# Patient Record
Sex: MALE | Race: WHITE | Employment: OTHER | URBAN - METROPOLITAN AREA
[De-identification: names, ages, dates, MRNs, and addresses within clinical notes are randomized per-mention and may not be internally consistent; named-entity substitution may affect disease eponyms.]

---

## 2017-02-07 PROBLEM — I21.02 ST ELEVATION MYOCARDIAL INFARCTION INVOLVING LEFT ANTERIOR DESCENDING (LAD) CORONARY ARTERY (HCC): Status: ACTIVE | Noted: 2017-02-07

## 2017-03-10 PROBLEM — I25.10 CORONARY ARTERY DISEASE INVOLVING NATIVE CORONARY ARTERY OF NATIVE HEART WITHOUT ANGINA PECTORIS: Status: ACTIVE | Noted: 2017-03-10

## 2017-03-10 PROBLEM — I21.02 ST ELEVATION MYOCARDIAL INFARCTION INVOLVING LEFT ANTERIOR DESCENDING (LAD) CORONARY ARTERY (HCC): Status: RESOLVED | Noted: 2017-02-07 | Resolved: 2017-03-10

## 2017-06-09 PROBLEM — E11.9 CONTROLLED TYPE 2 DIABETES MELLITUS (HCC): Status: ACTIVE | Noted: 2017-06-09

## 2017-06-09 PROBLEM — I25.5 ISCHEMIC CARDIOMYOPATHY: Status: ACTIVE | Noted: 2017-06-09

## 2019-01-25 PROBLEM — Z88.8 ALLERGY TO STATIN MEDICATION: Status: ACTIVE | Noted: 2019-01-25

## 2019-01-25 PROBLEM — E66.01 SEVERE OBESITY (HCC): Status: ACTIVE | Noted: 2019-01-25

## 2019-01-25 PROBLEM — I25.118 CORONARY ARTERY DISEASE OF NATIVE ARTERY OF NATIVE HEART WITH STABLE ANGINA PECTORIS (HCC): Status: ACTIVE | Noted: 2017-03-10

## 2019-01-25 PROBLEM — E78.01 FAMILIAL HYPERCHOLESTEROLEMIA: Status: ACTIVE | Noted: 2019-01-25

## 2020-05-27 ENCOUNTER — HOSPITAL ENCOUNTER (OUTPATIENT)
Dept: GENERAL RADIOLOGY | Age: 65
Discharge: HOME | End: 2020-05-27
Attending: NURSE PRACTITIONER
Payer: COMMERCIAL

## 2020-05-27 DIAGNOSIS — M54.9 ACUTE BACK PAIN LESS THAN 4 WEEKS DURATION: ICD-10-CM

## 2020-05-27 PROCEDURE — 72100 X-RAY EXAM L-S SPINE 2/3 VWS: CPT

## 2020-05-27 PROCEDURE — 72070 X-RAY EXAM THORAC SPINE 2VWS: CPT

## 2020-05-27 NOTE — PROGRESS NOTES
Please let Tamiko Watson know his xrays appear to be unchanged from prior imaging studies. He could benefit from PT and I can refer him their if he would like. Continue medications and follow up as planned.

## 2020-05-27 NOTE — PROGRESS NOTES
I called pt and left detailed msg stating xray results and to return call and let us know if he wants PT referral

## 2020-09-11 ENCOUNTER — HOSPITAL ENCOUNTER (OUTPATIENT)
Dept: MRI IMAGING | Age: 65
Discharge: HOME | End: 2020-09-11
Attending: INTERNAL MEDICINE
Payer: MEDICARE

## 2020-09-11 DIAGNOSIS — M54.50 CHRONIC BILATERAL LOW BACK PAIN WITHOUT SCIATICA: ICD-10-CM

## 2020-09-11 DIAGNOSIS — G89.29 CHRONIC BILATERAL LOW BACK PAIN WITHOUT SCIATICA: ICD-10-CM

## 2020-09-11 PROCEDURE — 72148 MRI LUMBAR SPINE W/O DYE: CPT

## 2022-03-04 PROBLEM — Z12.5 SPECIAL SCREENING FOR MALIGNANT NEOPLASM OF PROSTATE: Status: ACTIVE | Noted: 2022-03-04

## 2022-03-18 PROBLEM — E66.01 SEVERE OBESITY (HCC): Status: ACTIVE | Noted: 2019-01-25

## 2022-03-18 PROBLEM — E78.01 FAMILIAL HYPERCHOLESTEROLEMIA: Status: ACTIVE | Noted: 2019-01-25

## 2022-03-18 PROBLEM — I25.5 ISCHEMIC CARDIOMYOPATHY: Status: ACTIVE | Noted: 2017-06-09

## 2022-03-19 PROBLEM — Z88.8 ALLERGY TO STATIN MEDICATION: Status: ACTIVE | Noted: 2019-01-25

## 2022-03-19 PROBLEM — I25.118 CORONARY ARTERY DISEASE OF NATIVE ARTERY OF NATIVE HEART WITH STABLE ANGINA PECTORIS (HCC): Status: ACTIVE | Noted: 2017-03-10

## 2022-03-19 PROBLEM — Z12.5 SPECIAL SCREENING FOR MALIGNANT NEOPLASM OF PROSTATE: Status: ACTIVE | Noted: 2022-03-04

## 2022-03-20 PROBLEM — E11.9 DIABETES MELLITUS TYPE II, CONTROLLED (HCC): Status: ACTIVE | Noted: 2017-06-09

## 2022-04-03 PROBLEM — Z12.5 SPECIAL SCREENING FOR MALIGNANT NEOPLASM OF PROSTATE: Status: RESOLVED | Noted: 2022-03-04 | Resolved: 2022-04-03

## 2022-08-24 DIAGNOSIS — E11.59 CONTROLLED TYPE 2 DIABETES MELLITUS WITH OTHER CIRCULATORY COMPLICATION, WITHOUT LONG-TERM CURRENT USE OF INSULIN (HCC): Primary | ICD-10-CM

## 2022-08-24 DIAGNOSIS — Z12.5 SPECIAL SCREENING FOR MALIGNANT NEOPLASM OF PROSTATE: ICD-10-CM

## 2022-08-24 DIAGNOSIS — I25.118 CORONARY ARTERY DISEASE OF NATIVE ARTERY OF NATIVE HEART WITH STABLE ANGINA PECTORIS (HCC): ICD-10-CM

## 2022-08-24 DIAGNOSIS — I25.5 ISCHEMIC CARDIOMYOPATHY: ICD-10-CM

## 2022-08-30 DIAGNOSIS — F51.01 PRIMARY INSOMNIA: Primary | ICD-10-CM

## 2022-08-30 RX ORDER — ZOLPIDEM TARTRATE 10 MG/1
10 TABLET ORAL NIGHTLY PRN
Qty: 90 TABLET | Refills: 1 | Status: SHIPPED | OUTPATIENT
Start: 2022-08-30 | End: 2022-11-28

## 2022-09-02 DIAGNOSIS — E11.59 CONTROLLED TYPE 2 DIABETES MELLITUS WITH OTHER CIRCULATORY COMPLICATION, WITHOUT LONG-TERM CURRENT USE OF INSULIN (HCC): ICD-10-CM

## 2022-09-02 DIAGNOSIS — I25.118 CORONARY ARTERY DISEASE OF NATIVE ARTERY OF NATIVE HEART WITH STABLE ANGINA PECTORIS (HCC): ICD-10-CM

## 2022-09-02 DIAGNOSIS — Z12.5 SPECIAL SCREENING FOR MALIGNANT NEOPLASM OF PROSTATE: ICD-10-CM

## 2022-09-02 DIAGNOSIS — I25.5 ISCHEMIC CARDIOMYOPATHY: ICD-10-CM

## 2022-09-02 LAB
ALBUMIN SERPL-MCNC: 3.7 G/DL (ref 3.2–4.6)
ALBUMIN/GLOB SERPL: 0.9 {RATIO} (ref 1.2–3.5)
ALP SERPL-CCNC: 60 U/L (ref 50–136)
ALT SERPL-CCNC: 19 U/L (ref 12–65)
ANION GAP SERPL CALC-SCNC: 4 MMOL/L (ref 4–13)
AST SERPL-CCNC: 8 U/L (ref 15–37)
BASOPHILS # BLD: 0 K/UL (ref 0–0.2)
BASOPHILS NFR BLD: 1 % (ref 0–2)
BILIRUB SERPL-MCNC: 0.8 MG/DL (ref 0.2–1.1)
BUN SERPL-MCNC: 21 MG/DL (ref 8–23)
CALCIUM SERPL-MCNC: 9 MG/DL (ref 8.3–10.4)
CHLORIDE SERPL-SCNC: 108 MMOL/L (ref 101–110)
CHOLEST SERPL-MCNC: 200 MG/DL
CO2 SERPL-SCNC: 27 MMOL/L (ref 21–32)
CREAT SERPL-MCNC: 1.1 MG/DL (ref 0.8–1.5)
CREAT UR-MCNC: 260 MG/DL
DIFFERENTIAL METHOD BLD: NORMAL
EOSINOPHIL # BLD: 0.3 K/UL (ref 0–0.8)
EOSINOPHIL NFR BLD: 5 % (ref 0.5–7.8)
ERYTHROCYTE [DISTWIDTH] IN BLOOD BY AUTOMATED COUNT: 13.9 % (ref 11.9–14.6)
GLOBULIN SER CALC-MCNC: 4 G/DL (ref 2.3–3.5)
GLUCOSE SERPL-MCNC: 96 MG/DL (ref 65–100)
HCT VFR BLD AUTO: 46 % (ref 41.1–50.3)
HDLC SERPL-MCNC: 33 MG/DL (ref 40–60)
HDLC SERPL: 6.1 {RATIO}
HGB BLD-MCNC: 14.6 G/DL (ref 13.6–17.2)
IMM GRANULOCYTES # BLD AUTO: 0 K/UL (ref 0–0.5)
IMM GRANULOCYTES NFR BLD AUTO: 0 % (ref 0–5)
LDLC SERPL CALC-MCNC: 139.8 MG/DL
LYMPHOCYTES # BLD: 2.4 K/UL (ref 0.5–4.6)
LYMPHOCYTES NFR BLD: 35 % (ref 13–44)
MCH RBC QN AUTO: 28.9 PG (ref 26.1–32.9)
MCHC RBC AUTO-ENTMCNC: 31.7 G/DL (ref 31.4–35)
MCV RBC AUTO: 90.9 FL (ref 79.6–97.8)
MICROALBUMIN UR-MCNC: 2.05 MG/DL
MICROALBUMIN/CREAT UR-RTO: 8 MG/G
MONOCYTES # BLD: 0.5 K/UL (ref 0.1–1.3)
MONOCYTES NFR BLD: 8 % (ref 4–12)
NEUTS SEG # BLD: 3.6 K/UL (ref 1.7–8.2)
NEUTS SEG NFR BLD: 51 % (ref 43–78)
NRBC # BLD: 0 K/UL (ref 0–0.2)
PLATELET # BLD AUTO: 208 K/UL (ref 150–450)
PMV BLD AUTO: 10.6 FL (ref 9.4–12.3)
POTASSIUM SERPL-SCNC: 4.3 MMOL/L (ref 3.5–5.1)
PROT SERPL-MCNC: 7.7 G/DL (ref 6.3–8.2)
PSA SERPL-MCNC: 0.6 NG/ML
RBC # BLD AUTO: 5.06 M/UL (ref 4.23–5.6)
SODIUM SERPL-SCNC: 139 MMOL/L (ref 138–145)
TRIGL SERPL-MCNC: 136 MG/DL (ref 35–150)
VLDLC SERPL CALC-MCNC: 27.2 MG/DL (ref 6–23)
WBC # BLD AUTO: 6.9 K/UL (ref 4.3–11.1)

## 2022-09-03 LAB
EST. AVERAGE GLUCOSE BLD GHB EST-MCNC: 123 MG/DL
HBA1C MFR BLD: 5.9 % (ref 4.8–5.6)

## 2022-09-09 ENCOUNTER — OFFICE VISIT (OUTPATIENT)
Dept: INTERNAL MEDICINE CLINIC | Facility: CLINIC | Age: 67
End: 2022-09-09
Payer: MEDICARE

## 2022-09-09 VITALS
DIASTOLIC BLOOD PRESSURE: 70 MMHG | OXYGEN SATURATION: 94 % | BODY MASS INDEX: 35.1 KG/M2 | HEART RATE: 74 BPM | HEIGHT: 69 IN | RESPIRATION RATE: 16 BRPM | WEIGHT: 237 LBS | TEMPERATURE: 97 F | SYSTOLIC BLOOD PRESSURE: 120 MMHG

## 2022-09-09 DIAGNOSIS — I25.118 CORONARY ARTERY DISEASE OF NATIVE ARTERY OF NATIVE HEART WITH STABLE ANGINA PECTORIS (HCC): Primary | ICD-10-CM

## 2022-09-09 DIAGNOSIS — E11.8 CONTROLLED TYPE 2 DIABETES MELLITUS WITH COMPLICATION, WITH LONG-TERM CURRENT USE OF INSULIN (HCC): ICD-10-CM

## 2022-09-09 DIAGNOSIS — E78.2 MIXED HYPERLIPIDEMIA: ICD-10-CM

## 2022-09-09 DIAGNOSIS — Z79.4 CONTROLLED TYPE 2 DIABETES MELLITUS WITH COMPLICATION, WITH LONG-TERM CURRENT USE OF INSULIN (HCC): ICD-10-CM

## 2022-09-09 DIAGNOSIS — F41.9 ANXIETY: ICD-10-CM

## 2022-09-09 DIAGNOSIS — R68.89 OTHER GENERAL SYMPTOMS AND SIGNS: ICD-10-CM

## 2022-09-09 DIAGNOSIS — I10 HYPERTENSION, UNSPECIFIED TYPE: ICD-10-CM

## 2022-09-09 DIAGNOSIS — M54.16 LUMBAR RADICULOPATHY: ICD-10-CM

## 2022-09-09 DIAGNOSIS — I25.5 ISCHEMIC CARDIOMYOPATHY: ICD-10-CM

## 2022-09-09 PROBLEM — I25.10 CORONARY ATHEROSCLEROSIS: Status: ACTIVE | Noted: 2017-03-10

## 2022-09-09 PROBLEM — E11.9 DIABETES MELLITUS TYPE II, CONTROLLED (HCC): Status: ACTIVE | Noted: 2017-06-09

## 2022-09-09 PROBLEM — I21.02 ST ELEVATION MYOCARDIAL INFARCTION INVOLVING LEFT ANTERIOR DESCENDING (LAD) CORONARY ARTERY (HCC): Status: ACTIVE | Noted: 2017-01-28

## 2022-09-09 PROCEDURE — 3017F COLORECTAL CA SCREEN DOC REV: CPT | Performed by: NURSE PRACTITIONER

## 2022-09-09 PROCEDURE — 1123F ACP DISCUSS/DSCN MKR DOCD: CPT | Performed by: NURSE PRACTITIONER

## 2022-09-09 PROCEDURE — 2022F DILAT RTA XM EVC RTNOPTHY: CPT | Performed by: NURSE PRACTITIONER

## 2022-09-09 PROCEDURE — G8427 DOCREV CUR MEDS BY ELIG CLIN: HCPCS | Performed by: NURSE PRACTITIONER

## 2022-09-09 PROCEDURE — G8417 CALC BMI ABV UP PARAM F/U: HCPCS | Performed by: NURSE PRACTITIONER

## 2022-09-09 PROCEDURE — 3044F HG A1C LEVEL LT 7.0%: CPT | Performed by: NURSE PRACTITIONER

## 2022-09-09 PROCEDURE — 1036F TOBACCO NON-USER: CPT | Performed by: NURSE PRACTITIONER

## 2022-09-09 PROCEDURE — 99214 OFFICE O/P EST MOD 30 MIN: CPT | Performed by: NURSE PRACTITIONER

## 2022-09-09 RX ORDER — CYANOCOBALAMIN 1000 UG/ML
1000 INJECTION INTRAMUSCULAR; SUBCUTANEOUS
Qty: 10 ML | Refills: 0 | Status: SHIPPED | OUTPATIENT
Start: 2022-09-09

## 2022-09-09 RX ORDER — PAROXETINE HYDROCHLORIDE 20 MG/1
20 TABLET, FILM COATED ORAL DAILY
Qty: 90 TABLET | Refills: 3 | Status: SHIPPED | OUTPATIENT
Start: 2022-09-09

## 2022-09-09 RX ORDER — CLOPIDOGREL BISULFATE 75 MG/1
75 TABLET ORAL DAILY
Qty: 90 TABLET | Refills: 3 | Status: SHIPPED | OUTPATIENT
Start: 2022-09-09

## 2022-09-09 RX ORDER — METOPROLOL SUCCINATE 50 MG/1
50 TABLET, EXTENDED RELEASE ORAL DAILY
Qty: 90 TABLET | Refills: 3 | Status: SHIPPED | OUTPATIENT
Start: 2022-09-09

## 2022-09-09 RX ORDER — SPIRONOLACTONE 25 MG/1
25 TABLET ORAL DAILY
Qty: 90 TABLET | Refills: 3 | Status: SHIPPED | OUTPATIENT
Start: 2022-09-09

## 2022-09-09 ASSESSMENT — PATIENT HEALTH QUESTIONNAIRE - PHQ9
1. LITTLE INTEREST OR PLEASURE IN DOING THINGS: 0
2. FEELING DOWN, DEPRESSED OR HOPELESS: 0
SUM OF ALL RESPONSES TO PHQ QUESTIONS 1-9: 0
SUM OF ALL RESPONSES TO PHQ9 QUESTIONS 1 & 2: 0
SUM OF ALL RESPONSES TO PHQ QUESTIONS 1-9: 0

## 2022-09-09 ASSESSMENT — ENCOUNTER SYMPTOMS
BACK PAIN: 1
SHORTNESS OF BREATH: 0
SORE THROAT: 0
VOMITING: 0
COUGH: 0
NAUSEA: 0
WHEEZING: 0
CONSTIPATION: 0
DIARRHEA: 0
ABDOMINAL PAIN: 0

## 2022-09-09 NOTE — PROGRESS NOTES
[facility]      2022    Patient Name: Lorna Fernandez  :  1955      Chief Complaint:  Chief Complaint   Patient presents with    Follow-up     6 month         HPI  Patient presents today for follow-up on chronic conditions, result review and medication refills. Symptoms are stable on current medical regimen. Denies any current SE at this time. The patient is following treatment regimen with good compliance. Patient has complaint of right-sided low back pain. He reports history of chronic low back pain s/p lumbar laminectomy and fusion at L4-L5 with intervertebral disc implant with Dr. Kevin Dorman. The pain has worsened over the past 4-5 months. He denies any known injury mechanism to explain the worsening. The pain extends down his  legs, worse on the right side. He describes the leg pain as intermittent and aching. Lying down aggravates the symptoms. Being active and stretching alleviates the pain somewhat. He reports history of spinal injections (he believes with Dr. Luz Smith) that provided some relief, and he is interested in referral back there today. Labs reviewed and discussed. Medications refilled as needed during office visit or adjusted based on lab results and/or our discussion as appropriate. See discussion.       Past Medical History:   Diagnosis Date    Controlled type 2 diabetes mellitus ([facility] [address] 2017    Panic attacks     ST elevation myocardial infarction involving left anterior descending (LAD) coronary artery ([facility] [address] 2017       Past Surgical History:   Procedure Laterality Date    APPENDECTOMY      CHOLECYSTECTOMY      ORTHOPEDIC SURGERY  2014    back surgery    ORTHOPEDIC SURGERY      back surgery    MA ABDOMEN SURGERY PROC UNLISTED      hernia surgery       Family History   Problem Relation Age of Onset    Parkinsonism Mother     Cancer Father         renal cell carcinoma    Cancer Maternal Grandfather         lung-smoker    Other Paternal Grandmother Anuerysm    High Cholesterol Neg Hx        Social History     Tobacco Use    Smoking status: Former     Types: Cigarettes     Quit date: 3/21/1989     Years since quittin.4    Smokeless tobacco: Never   Substance Use Topics    Alcohol use: No    Drug use: No       Current Outpatient Medications:     clopidogrel (PLAVIX) 75 MG tablet, Take 1 tablet by mouth daily, Disp: 90 tablet, Rfl: 3    cyanocobalamin 1000 MCG/ML injection, Inject 1 mL into the muscle every 30 days, Disp: 10 mL, Rfl: 0    empagliflozin (JARDIANCE) 10 MG tablet, Take 1 tablet by mouth daily, Disp: 90 tablet, Rfl: 3    metoprolol succinate (TOPROL XL) 50 MG extended release tablet, Take 1 tablet by mouth daily, Disp: 90 tablet, Rfl: 3    PARoxetine (PAXIL) 20 MG tablet, Take 1 tablet by mouth daily, Disp: 90 tablet, Rfl: 3    sacubitril-valsartan (ENTRESTO) 49-51 MG per tablet, Take 1 tablet by mouth 2 times daily, Disp: 180 tablet, Rfl: 3    spironolactone (ALDACTONE) 25 MG tablet, Take 1 tablet by mouth daily, Disp: 90 tablet, Rfl: 3    zolpidem (AMBIEN) 10 MG tablet, Take 1 tablet by mouth nightly as needed for Sleep for up to 90 days. , Disp: 90 tablet, Rfl: 1    aspirin 81 MG EC tablet, Take by mouth daily, Disp: , Rfl:     Coenzyme Q10 10 MG CAPS, Take by mouth, Disp: , Rfl:     nitroGLYCERIN (NITROSTAT) 0.4 MG SL tablet, Place 0.4 mg under the tongue, Disp: , Rfl:     Allergies   Allergen Reactions    Codeine Hives, Shortness Of Breath and Swelling    Atorvastatin Other (See Comments)    Gabapentin Other (See Comments)     Can't tolerate    Rosuvastatin Other (See Comments)       Review of Systems   Constitutional:  Negative for chills and fever. HENT:  Negative for congestion, ear pain and sore throat. Eyes:  Negative for visual disturbance. Respiratory:  Negative for cough, shortness of breath and wheezing. Cardiovascular:  Negative for chest pain, palpitations and leg swelling.    Gastrointestinal:  Negative for abdominal pain, constipation, diarrhea, nausea and vomiting. Genitourinary:  Negative for dysuria and hematuria. Musculoskeletal:  Positive for back pain. Negative for arthralgias and gait problem. Neurological:  Negative for dizziness, light-headedness and headaches. Psychiatric/Behavioral:  The patient is not nervous/anxious. Objective:  /70 (Site: Left Upper Arm, Position: Sitting, Cuff Size: Medium Adult)   Pulse 74   Temp 97 °F (36.1 °C) (Temporal)   Resp 16   Ht 5' 9\" (1.753 m)   Wt 237 lb (107.5 kg)   SpO2 94%   BMI 35.00 kg/m²     Examination:  Physical Exam  Vitals and nursing note reviewed. Constitutional:       General: He is not in acute distress. Appearance: Normal appearance. HENT:      Right Ear: Tympanic membrane, ear canal and external ear normal.      Left Ear: Tympanic membrane, ear canal and external ear normal.      Nose: Nose normal.      Mouth/Throat:      Mouth: Mucous membranes are moist.      Pharynx: Oropharynx is clear. Eyes:      Extraocular Movements: Extraocular movements intact. Pupils: Pupils are equal, round, and reactive to light. Cardiovascular:      Rate and Rhythm: Normal rate and regular rhythm. Pulses:           Dorsalis pedis pulses are 2+ on the right side and 2+ on the left side. Heart sounds: Normal heart sounds. Pulmonary:      Effort: Pulmonary effort is normal. No respiratory distress. Breath sounds: Normal breath sounds. Abdominal:      General: Bowel sounds are normal.      Palpations: Abdomen is soft. Tenderness: There is no abdominal tenderness. Musculoskeletal:      Lumbar back: Tenderness present. Decreased range of motion. Right lower leg: No edema. Left lower leg: No edema. Skin:     General: Skin is warm and dry. Neurological:      Mental Status: He is alert and oriented to person, place, and time.    Psychiatric:         Mood and Affect: Mood normal.         Lab Results   Component Value Date/Time    WBC 6.9 09/02/2022 09:01 AM    HGB 14.6 09/02/2022 09:01 AM    HCT 46.0 09/02/2022 09:01 AM    MCV 90.9 09/02/2022 09:01 AM    RDW 13.9 09/02/2022 09:01 AM     09/02/2022 09:01 AM    NEUTOPHILPCT 57 12/17/2021 09:13 AM    LYMPHOPCT 35 09/02/2022 09:01 AM    LYMPHOPCT 31 12/17/2021 09:13 AM    MONOPCT 8 09/02/2022 09:01 AM    MONOPCT 6 12/17/2021 09:13 AM    EOSRELPCT 5 09/02/2022 09:01 AM    BASOPCT 1 09/02/2022 09:01 AM    BASOPCT 1 12/17/2021 09:13 AM    LYMPHSABS 2.4 09/02/2022 09:01 AM    LYMPHSABS 2.8 12/17/2021 09:13 AM    MONOSABS 0.5 09/02/2022 09:01 AM    MONOSABS 0.5 12/17/2021 09:13 AM    EOSABS 0.3 09/02/2022 09:01 AM    EOSABS 0.4 12/17/2021 09:13 AM    BASOSABS 0.0 09/02/2022 09:01 AM    IMMGRAN 0 09/02/2022 09:01 AM    GRANULOCYTEABSOLUTECOUNT 0.1 12/17/2021 09:13 AM       Lab Results   Component Value Date/Time     09/02/2022 09:01 AM    K 4.3 09/02/2022 09:01 AM     09/02/2022 09:01 AM    CO2 27 09/02/2022 09:01 AM    ANIONGAP 4 09/02/2022 09:01 AM    GLUCOSE 96 09/02/2022 09:01 AM    BUN 21 09/02/2022 09:01 AM    CREATININE 1.10 09/02/2022 09:01 AM    GFRAA >60 09/02/2022 09:01 AM    LABGLOM >60 09/02/2022 09:01 AM    CALCIUM 9.0 09/02/2022 09:01 AM    BILITOT 0.8 09/02/2022 09:01 AM    ALT 19 09/02/2022 09:01 AM    AST 8 09/02/2022 09:01 AM    ALKPHOS 60 09/02/2022 09:01 AM    ALKPHOS 68 12/17/2021 09:13 AM    PROT 7.7 09/02/2022 09:01 AM    LABALBU 3.7 09/02/2022 09:01 AM    GLOB 4.0 09/02/2022 09:01 AM    ALBUMIN 0.9 09/02/2022 09:01 AM       Lab Results   Component Value Date/Time    CHOL 200 09/02/2022 09:01 AM    HDL 33 09/02/2022 09:01 AM    TRIG 136 09/02/2022 09:01 AM    LDLCALC 139.8 09/02/2022 09:01 AM    VLDL 44 12/17/2021 09:13 AM       Lab Results   Component Value Date/Time    LABA1C 5.9 09/02/2022 09:01 AM    LABA1C 6.2 12/17/2021 09:13 AM    LABA1C 6.1 09/03/2021 07:58 AM    LABA1C 6.1 02/19/2021 08:01 AM    LABA1C 6.0 08/07/2020 08:28 AM       Lab Results   Component Value Date/Time    TSH 1.540 02/19/2021 08:01 AM    TSH 1.110 08/07/2020 08:28 AM       Lab Results   Component Value Date/Time    PSA 0.6 09/02/2022 09:01 AM    PSA 0.6 02/19/2021 08:01 AM    PSA 0.6 08/07/2020 08:28 AM       Lab Results   Component Value Date/Time    SPECGRAV 1.024 02/19/2021 08:01 AM    PHUR 5.0 02/19/2021 08:01 AM    COLORU Yellow 02/19/2021 08:01 AM    CLARITYU Clear 02/19/2021 08:01 AM    LEUKOCYTESUR Negative 02/19/2021 08:01 AM    PROTEINU Negative 02/19/2021 08:01 AM    GLUCOSEU Negative 02/19/2021 08:01 AM    KETUA Negative 02/19/2021 08:01 AM    BLOODU Negative 02/19/2021 08:01 AM    BILIRUBINUR Negative 02/19/2021 08:01 AM    UROBILINOGEN 0.2 02/19/2021 08:01 AM    NITRU Negative 02/19/2021 08:01 AM    LABMICR 2.05 09/02/2022 09:01 AM    LABMICR Comment 02/19/2021 08:01 AM       Assessment/Plan:    Marilu Esposito was seen today for follow-up. Diagnoses and all orders for this visit:    Coronary artery disease of native artery of native heart with stable angina pectoris ([facility])  -     clopidogrel (PLAVIX) 75 MG tablet; Take 1 tablet by mouth daily  -     metoprolol succinate (TOPROL XL) 50 MG extended release tablet; Take 1 tablet by mouth daily  Asymptomatic. Scheduled to follow-up with cardiology next month. Continue current regimen. Hypertension, unspecified type  We-controlled. Continue current regimen. Ischemic cardiomyopathy  -     metoprolol succinate (TOPROL XL) 50 MG extended release tablet; Take 1 tablet by mouth daily  -     sacubitril-valsartan (ENTRESTO) 49-51 MG per tablet; Take 1 tablet by mouth 2 times daily  -     spironolactone (ALDACTONE) 25 MG tablet; Take 1 tablet by mouth daily  Continue current regimen. Controlled type 2 diabetes mellitus with complication, with long-term current use of insulin ([facility])  -     empagliflozin (JARDIANCE) 10 MG tablet; Take 1 tablet by mouth daily  Well-controlled.      Mixed hyperlipidemia  Patient unable to tolerate statins. Insurance will not cover [address] [place] [address]. Cholesterol slightly improved since checked last month. Healthy diet and exercise encouraged. Will continue to monitor. Anxiety  -     PARoxetine (PAXIL) 20 MG tablet; Take 1 tablet by mouth daily  Continue. Lumbar radiculopathy  -     External Referral To Pain Medicine  Referral back to pain management placed as requested. Follow-up and Dispositions    Return in about 6 months (around 3/9/2023). On this date, 9/9/22, I have spent 35 minutes reviewing previous notes, test results and face to face with the patient discussing the diagnosis and importance of compliance with the treatment plan as well as documenting on the day of the visit. An electronic signature was used to authenticate this note.   DELLA Hopkins

## 2023-04-07 DIAGNOSIS — E11.8 CONTROLLED TYPE 2 DIABETES MELLITUS WITH COMPLICATION, WITH LONG-TERM CURRENT USE OF INSULIN (HCC): ICD-10-CM

## 2023-04-07 DIAGNOSIS — Z79.4 CONTROLLED TYPE 2 DIABETES MELLITUS WITH COMPLICATION, WITH LONG-TERM CURRENT USE OF INSULIN (HCC): Primary | ICD-10-CM

## 2023-04-07 DIAGNOSIS — I10 HYPERTENSION, UNSPECIFIED TYPE: ICD-10-CM

## 2023-04-07 DIAGNOSIS — E78.2 MIXED HYPERLIPIDEMIA: ICD-10-CM

## 2023-04-07 DIAGNOSIS — E11.8 CONTROLLED TYPE 2 DIABETES MELLITUS WITH COMPLICATION, WITH LONG-TERM CURRENT USE OF INSULIN (HCC): Primary | ICD-10-CM

## 2023-04-07 DIAGNOSIS — Z79.4 CONTROLLED TYPE 2 DIABETES MELLITUS WITH COMPLICATION, WITH LONG-TERM CURRENT USE OF INSULIN (HCC): ICD-10-CM

## 2023-04-07 LAB
ALBUMIN SERPL-MCNC: 3.7 G/DL (ref 3.2–4.6)
ALBUMIN/GLOB SERPL: 1.2 (ref 0.4–1.6)
ALP SERPL-CCNC: 54 U/L (ref 50–136)
ALT SERPL-CCNC: 20 U/L (ref 12–65)
ANION GAP SERPL CALC-SCNC: 5 MMOL/L (ref 2–11)
AST SERPL-CCNC: 12 U/L (ref 15–37)
BASOPHILS # BLD: 0.1 K/UL (ref 0–0.2)
BASOPHILS NFR BLD: 1 % (ref 0–2)
BILIRUB SERPL-MCNC: 0.6 MG/DL (ref 0.2–1.1)
BUN SERPL-MCNC: 19 MG/DL (ref 8–23)
CALCIUM SERPL-MCNC: 9.2 MG/DL (ref 8.3–10.4)
CHLORIDE SERPL-SCNC: 107 MMOL/L (ref 101–110)
CHOLEST SERPL-MCNC: 216 MG/DL
CO2 SERPL-SCNC: 27 MMOL/L (ref 21–32)
CREAT SERPL-MCNC: 0.9 MG/DL (ref 0.8–1.5)
DIFFERENTIAL METHOD BLD: NORMAL
EOSINOPHIL # BLD: 0.4 K/UL (ref 0–0.8)
EOSINOPHIL NFR BLD: 5 % (ref 0.5–7.8)
ERYTHROCYTE [DISTWIDTH] IN BLOOD BY AUTOMATED COUNT: 13.5 % (ref 11.9–14.6)
EST. AVERAGE GLUCOSE BLD GHB EST-MCNC: 120 MG/DL
GLOBULIN SER CALC-MCNC: 3.2 G/DL (ref 2.8–4.5)
GLUCOSE SERPL-MCNC: 106 MG/DL (ref 65–100)
HBA1C MFR BLD: 5.8 % (ref 4.8–5.6)
HCT VFR BLD AUTO: 47 % (ref 41.1–50.3)
HDLC SERPL-MCNC: 36 MG/DL (ref 40–60)
HDLC SERPL: 6
HGB BLD-MCNC: 14.8 G/DL (ref 13.6–17.2)
IMM GRANULOCYTES # BLD AUTO: 0 K/UL (ref 0–0.5)
IMM GRANULOCYTES NFR BLD AUTO: 0 % (ref 0–5)
LDLC SERPL CALC-MCNC: 136.2 MG/DL
LYMPHOCYTES # BLD: 2.4 K/UL (ref 0.5–4.6)
LYMPHOCYTES NFR BLD: 34 % (ref 13–44)
MCH RBC QN AUTO: 29.2 PG (ref 26.1–32.9)
MCHC RBC AUTO-ENTMCNC: 31.5 G/DL (ref 31.4–35)
MCV RBC AUTO: 92.9 FL (ref 82–102)
MONOCYTES # BLD: 0.6 K/UL (ref 0.1–1.3)
MONOCYTES NFR BLD: 8 % (ref 4–12)
NEUTS SEG # BLD: 3.7 K/UL (ref 1.7–8.2)
NEUTS SEG NFR BLD: 52 % (ref 43–78)
NRBC # BLD: 0 K/UL (ref 0–0.2)
PLATELET # BLD AUTO: 220 K/UL (ref 150–450)
PMV BLD AUTO: 10.5 FL (ref 9.4–12.3)
POTASSIUM SERPL-SCNC: 4.8 MMOL/L (ref 3.5–5.1)
PROT SERPL-MCNC: 6.9 G/DL (ref 6.3–8.2)
RBC # BLD AUTO: 5.06 M/UL (ref 4.23–5.6)
SODIUM SERPL-SCNC: 139 MMOL/L (ref 133–143)
TRIGL SERPL-MCNC: 219 MG/DL (ref 35–150)
TSH, 3RD GENERATION: 0.79 UIU/ML (ref 0.36–3.74)
VLDLC SERPL CALC-MCNC: 43.8 MG/DL (ref 6–23)
WBC # BLD AUTO: 7.1 K/UL (ref 4.3–11.1)

## 2023-04-08 LAB
APPEARANCE UR: CLEAR
BACTERIA URNS QL MICRO: NEGATIVE /HPF
BILIRUB UR QL: NEGATIVE
CASTS URNS QL MICRO: ABNORMAL /LPF (ref 0–2)
COLOR UR: ABNORMAL
EPI CELLS #/AREA URNS HPF: ABNORMAL /HPF (ref 0–5)
GLUCOSE UR STRIP.AUTO-MCNC: 500 MG/DL
HGB UR QL STRIP: NEGATIVE
KETONES UR QL STRIP.AUTO: NEGATIVE MG/DL
LEUKOCYTE ESTERASE UR QL STRIP.AUTO: NEGATIVE
MUCOUS THREADS URNS QL MICRO: 0 /LPF
NITRITE UR QL STRIP.AUTO: NEGATIVE
PH UR STRIP: 5.5 (ref 5–9)
PROT UR STRIP-MCNC: NEGATIVE MG/DL
RBC #/AREA URNS HPF: ABNORMAL /HPF (ref 0–5)
SP GR UR REFRACTOMETRY: 1.02 (ref 1–1.02)
URINE CULTURE IF INDICATED: ABNORMAL
UROBILINOGEN UR QL STRIP.AUTO: 0.2 EU/DL (ref 0.2–1)
WBC URNS QL MICRO: ABNORMAL /HPF (ref 0–4)

## 2023-04-14 PROBLEM — F33.1 MAJOR DEPRESSIVE DISORDER, RECURRENT, MODERATE (HCC): Status: ACTIVE | Noted: 2023-04-14

## 2023-04-14 PROBLEM — E66.01 SEVERE OBESITY (BMI 35.0-39.9) WITH COMORBIDITY (HCC): Status: ACTIVE | Noted: 2023-04-14

## 2023-04-14 PROBLEM — F33.0 MAJOR DEPRESSIVE DISORDER, RECURRENT, MILD (HCC): Status: ACTIVE | Noted: 2023-04-14

## 2023-04-14 PROBLEM — F33.9 MAJOR DEPRESSIVE DISORDER, RECURRENT, UNSPECIFIED (HCC): Status: ACTIVE | Noted: 2023-04-14

## 2023-07-31 ENCOUNTER — TELEPHONE (OUTPATIENT)
Dept: INTERNAL MEDICINE CLINIC | Facility: CLINIC | Age: 68
End: 2023-07-31

## 2023-07-31 NOTE — TELEPHONE ENCOUNTER
Called patient to see if Epidural injection-Lumbar was done since Dr Kael Triana was out during the time [facility] ask for seven day Plavix hold. Patient states that he did have injection and since Dr Kael Triana had previously gave ok for hold of Plavix for injection. He just held Plavix.     Had previously tried to contact patient and [address] [place] [address] office on day of injection ( 7-

## 2023-10-11 DIAGNOSIS — Z79.4 CONTROLLED TYPE 2 DIABETES MELLITUS WITH COMPLICATION, WITH LONG-TERM CURRENT USE OF INSULIN (HCC): ICD-10-CM

## 2023-10-11 DIAGNOSIS — E11.8 CONTROLLED TYPE 2 DIABETES MELLITUS WITH COMPLICATION, WITH LONG-TERM CURRENT USE OF INSULIN (HCC): ICD-10-CM

## 2023-10-11 DIAGNOSIS — I25.118 CORONARY ARTERY DISEASE OF NATIVE ARTERY OF NATIVE HEART WITH STABLE ANGINA PECTORIS (HCC): ICD-10-CM

## 2023-10-11 DIAGNOSIS — E78.2 MIXED HYPERLIPIDEMIA: ICD-10-CM

## 2023-10-11 DIAGNOSIS — Z12.5 SCREENING FOR PROSTATE CANCER: Primary | ICD-10-CM

## 2023-10-17 ENCOUNTER — TELEPHONE (OUTPATIENT)
Dept: INTERNAL MEDICINE CLINIC | Facility: CLINIC | Age: 68
End: 2023-10-17

## 2023-10-17 SDOH — ECONOMIC STABILITY: TRANSPORTATION INSECURITY
IN THE PAST 12 MONTHS, HAS LACK OF TRANSPORTATION KEPT YOU FROM MEETINGS, WORK, OR FROM GETTING THINGS NEEDED FOR DAILY LIVING?: NO

## 2023-10-17 SDOH — ECONOMIC STABILITY: HOUSING INSECURITY
IN THE LAST 12 MONTHS, WAS THERE A TIME WHEN YOU DID NOT HAVE A STEADY PLACE TO SLEEP OR SLEPT IN A SHELTER (INCLUDING NOW)?: NO

## 2023-10-17 SDOH — ECONOMIC STABILITY: INCOME INSECURITY: HOW HARD IS IT FOR YOU TO PAY FOR THE VERY BASICS LIKE FOOD, HOUSING, MEDICAL CARE, AND HEATING?: NOT HARD AT ALL

## 2023-10-17 SDOH — ECONOMIC STABILITY: FOOD INSECURITY: WITHIN THE PAST 12 MONTHS, YOU WORRIED THAT YOUR FOOD WOULD RUN OUT BEFORE YOU GOT MONEY TO BUY MORE.: NEVER TRUE

## 2023-10-17 SDOH — ECONOMIC STABILITY: FOOD INSECURITY: WITHIN THE PAST 12 MONTHS, THE FOOD YOU BOUGHT JUST DIDN'T LAST AND YOU DIDN'T HAVE MONEY TO GET MORE.: NEVER TRUE

## 2023-10-17 ASSESSMENT — PATIENT HEALTH QUESTIONNAIRE - PHQ9
1. LITTLE INTEREST OR PLEASURE IN DOING THINGS: NOT AT ALL
SUM OF ALL RESPONSES TO PHQ QUESTIONS 1-9: 1
2. FEELING DOWN, DEPRESSED OR HOPELESS: 0
6. FEELING BAD ABOUT YOURSELF - OR THAT YOU ARE A FAILURE OR HAVE LET YOURSELF OR YOUR FAMILY DOWN: NOT AT ALL
8. MOVING OR SPEAKING SO SLOWLY THAT OTHER PEOPLE COULD HAVE NOTICED. OR THE OPPOSITE - BEING SO FIDGETY OR RESTLESS THAT YOU HAVE BEEN MOVING AROUND A LOT MORE THAN USUAL: NOT AT ALL
3. TROUBLE FALLING OR STAYING ASLEEP: 0
7. TROUBLE CONCENTRATING ON THINGS, SUCH AS READING THE NEWSPAPER OR WATCHING TELEVISION: NOT AT ALL
9. THOUGHTS THAT YOU WOULD BE BETTER OFF DEAD, OR OF HURTING YOURSELF: NOT AT ALL
7. TROUBLE CONCENTRATING ON THINGS, SUCH AS READING THE NEWSPAPER OR WATCHING TELEVISION: 0
6. FEELING BAD ABOUT YOURSELF - OR THAT YOU ARE A FAILURE OR HAVE LET YOURSELF OR YOUR FAMILY DOWN: 0
9. THOUGHTS THAT YOU WOULD BE BETTER OFF DEAD, OR OF HURTING YOURSELF: 0
5. POOR APPETITE OR OVEREATING: NOT AT ALL
SUM OF ALL RESPONSES TO PHQ QUESTIONS 1-9: 1
2. FEELING DOWN, DEPRESSED OR HOPELESS: NOT AT ALL
SUM OF ALL RESPONSES TO PHQ QUESTIONS 1-9: 1
10. IF YOU CHECKED OFF ANY PROBLEMS, HOW DIFFICULT HAVE THESE PROBLEMS MADE IT FOR YOU TO DO YOUR WORK, TAKE CARE OF THINGS AT HOME, OR GET ALONG WITH OTHER PEOPLE: NOT DIFFICULT AT ALL
SUM OF ALL RESPONSES TO PHQ QUESTIONS 1-9: 1
8. MOVING OR SPEAKING SO SLOWLY THAT OTHER PEOPLE COULD HAVE NOTICED. OR THE OPPOSITE, BEING SO FIGETY OR RESTLESS THAT YOU HAVE BEEN MOVING AROUND A LOT MORE THAN USUAL: 0
3. TROUBLE FALLING OR STAYING ASLEEP: NOT AT ALL
SUM OF ALL RESPONSES TO PHQ QUESTIONS 1-9: 1
4. FEELING TIRED OR HAVING LITTLE ENERGY: SEVERAL DAYS
4. FEELING TIRED OR HAVING LITTLE ENERGY: 1
1. LITTLE INTEREST OR PLEASURE IN DOING THINGS: 0
SUM OF ALL RESPONSES TO PHQ9 QUESTIONS 1 & 2: 0
10. IF YOU CHECKED OFF ANY PROBLEMS, HOW DIFFICULT HAVE THESE PROBLEMS MADE IT FOR YOU TO DO YOUR WORK, TAKE CARE OF THINGS AT HOME, OR GET ALONG WITH OTHER PEOPLE: 0
5. POOR APPETITE OR OVEREATING: 0

## 2023-10-17 NOTE — TELEPHONE ENCOUNTER
----- Message from Nathan Menjivar sent at 10/16/2023  8:41 AM EDT -----  Subject: Message to Provider    QUESTIONS  Information for Provider? Pt wants to know if he needs labs before his   Appt on 10/18/2023 Please advise   ---------------------------------------------------------------------------  --------------  Valeria Cordero INFO  [phone]; OK to leave message on voicemail  ---------------------------------------------------------------------------  --------------  SCRIPT ANSWERS  Relationship to Patient?  Self

## 2023-10-18 ENCOUNTER — OFFICE VISIT (OUTPATIENT)
Dept: INTERNAL MEDICINE CLINIC | Facility: CLINIC | Age: 68
End: 2023-10-18
Payer: MEDICARE

## 2023-10-18 VITALS
BODY MASS INDEX: 35.25 KG/M2 | TEMPERATURE: 99 F | HEIGHT: 69 IN | DIASTOLIC BLOOD PRESSURE: 72 MMHG | OXYGEN SATURATION: 96 % | SYSTOLIC BLOOD PRESSURE: 120 MMHG | HEART RATE: 62 BPM | WEIGHT: 238 LBS

## 2023-10-18 DIAGNOSIS — Z79.4 CONTROLLED TYPE 2 DIABETES MELLITUS WITH COMPLICATION, WITH LONG-TERM CURRENT USE OF INSULIN (HCC): ICD-10-CM

## 2023-10-18 DIAGNOSIS — F41.9 ANXIETY: ICD-10-CM

## 2023-10-18 DIAGNOSIS — I25.118 CORONARY ARTERY DISEASE OF NATIVE ARTERY OF NATIVE HEART WITH STABLE ANGINA PECTORIS (HCC): ICD-10-CM

## 2023-10-18 DIAGNOSIS — I25.5 ISCHEMIC CARDIOMYOPATHY: ICD-10-CM

## 2023-10-18 DIAGNOSIS — F51.01 PRIMARY INSOMNIA: Primary | ICD-10-CM

## 2023-10-18 DIAGNOSIS — E11.8 CONTROLLED TYPE 2 DIABETES MELLITUS WITH COMPLICATION, WITH LONG-TERM CURRENT USE OF INSULIN (HCC): ICD-10-CM

## 2023-10-18 PROBLEM — M48.062 SPINAL STENOSIS, LUMBAR REGION, WITH NEUROGENIC CLAUDICATION: Status: ACTIVE | Noted: 2023-07-26

## 2023-10-18 PROCEDURE — 1123F ACP DISCUSS/DSCN MKR DOCD: CPT | Performed by: INTERNAL MEDICINE

## 2023-10-18 PROCEDURE — 3044F HG A1C LEVEL LT 7.0%: CPT | Performed by: INTERNAL MEDICINE

## 2023-10-18 PROCEDURE — G8427 DOCREV CUR MEDS BY ELIG CLIN: HCPCS | Performed by: INTERNAL MEDICINE

## 2023-10-18 PROCEDURE — 1036F TOBACCO NON-USER: CPT | Performed by: INTERNAL MEDICINE

## 2023-10-18 PROCEDURE — 2022F DILAT RTA XM EVC RTNOPTHY: CPT | Performed by: INTERNAL MEDICINE

## 2023-10-18 PROCEDURE — G8484 FLU IMMUNIZE NO ADMIN: HCPCS | Performed by: INTERNAL MEDICINE

## 2023-10-18 PROCEDURE — 3017F COLORECTAL CA SCREEN DOC REV: CPT | Performed by: INTERNAL MEDICINE

## 2023-10-18 PROCEDURE — G8417 CALC BMI ABV UP PARAM F/U: HCPCS | Performed by: INTERNAL MEDICINE

## 2023-10-18 PROCEDURE — 99214 OFFICE O/P EST MOD 30 MIN: CPT | Performed by: INTERNAL MEDICINE

## 2023-10-18 RX ORDER — METOPROLOL SUCCINATE 50 MG/1
50 TABLET, EXTENDED RELEASE ORAL DAILY
Qty: 90 TABLET | Refills: 3 | Status: SHIPPED | OUTPATIENT
Start: 2023-10-18

## 2023-10-18 RX ORDER — CLOPIDOGREL BISULFATE 75 MG/1
75 TABLET ORAL DAILY
Qty: 90 TABLET | Refills: 3 | Status: SHIPPED | OUTPATIENT
Start: 2023-10-18

## 2023-10-18 RX ORDER — CYANOCOBALAMIN 1000 UG/ML
1000 INJECTION, SOLUTION INTRAMUSCULAR; SUBCUTANEOUS
Qty: 10 ML | Refills: 1 | Status: SHIPPED | OUTPATIENT
Start: 2023-10-18

## 2023-10-18 RX ORDER — PAROXETINE HYDROCHLORIDE 20 MG/1
20 TABLET, FILM COATED ORAL DAILY
Qty: 90 TABLET | Refills: 3 | Status: SHIPPED | OUTPATIENT
Start: 2023-10-18

## 2023-10-18 RX ORDER — ZOLPIDEM TARTRATE 10 MG/1
10 TABLET ORAL NIGHTLY PRN
Qty: 90 TABLET | Refills: 1 | Status: SHIPPED | OUTPATIENT
Start: 2023-10-18 | End: 2024-04-15

## 2023-10-18 RX ORDER — ZOLPIDEM TARTRATE 10 MG/1
TABLET ORAL
COMMUNITY
Start: 2023-07-31 | End: 2023-10-18 | Stop reason: SDUPTHER

## 2023-10-18 RX ORDER — HYDROCODONE BITARTRATE AND ACETAMINOPHEN 10; 325 MG/1; MG/1
TABLET ORAL
COMMUNITY
Start: 2023-10-04

## 2023-10-18 RX ORDER — SPIRONOLACTONE 25 MG/1
25 TABLET ORAL DAILY
Qty: 90 TABLET | Refills: 3 | Status: SHIPPED | OUTPATIENT
Start: 2023-10-18

## 2023-10-18 ASSESSMENT — ENCOUNTER SYMPTOMS
CHEST TIGHTNESS: 0
SHORTNESS OF BREATH: 0
BACK PAIN: 0

## 2023-10-18 NOTE — PROGRESS NOTES
ASSESSMENT/PLAN:    Labs collected this morning. Results pending. Will call and update with results. Medication refilled    Ambien refilled. Sleep study should be considered once recovered from spine surgery. He will let me know when ready to proceed. Chronic ED. Trimix? He may consider. Evaluation and management of the chronic condition(s) delineated. No negative side effects reported. I have reviewed all the lab results. There are some abnormalities that are either expected or not critical to the patient's health, and are discussed in the office today and are addressed. Please refer to the above assessement and plan narrative and orders and follow up plan. Medication discussed and refilled as needed. Physical exam findings are stable unless otherwise indicated and this is addressed. The most recent lab work and imaging and consultant/urgent care visits and imaging are reviewed and discussed and considered during this visit encounter. Crystal Valdez was seen today for diabetes and cholesterol problem. Diagnoses and all orders for this visit:    Primary insomnia  -     zolpidem (AMBIEN) 10 MG tablet; Take 1 tablet by mouth nightly as needed for Sleep for up to 180 days. Max Daily Amount: 10 mg    Coronary artery disease of native artery of native heart with stable angina pectoris (HCC)  -     clopidogrel (PLAVIX) 75 MG tablet; Take 1 tablet by mouth daily  -     metoprolol succinate (TOPROL XL) 50 MG extended release tablet; Take 1 tablet by mouth daily    Controlled type 2 diabetes mellitus with complication, with long-term current use of insulin (HCC)  -     empagliflozin (JARDIANCE) 10 MG tablet; Take 1 tablet by mouth daily    Ischemic cardiomyopathy  -     metoprolol succinate (TOPROL XL) 50 MG extended release tablet; Take 1 tablet by mouth daily  -     sacubitril-valsartan (ENTRESTO) 49-51 MG per tablet;  Take 1 tablet by mouth 2 times daily  -     spironolactone (ALDACTONE) 25

## 2024-05-01 ENCOUNTER — PATIENT MESSAGE (OUTPATIENT)
Dept: INTERNAL MEDICINE CLINIC | Facility: CLINIC | Age: 69
End: 2024-05-01

## 2024-05-01 DIAGNOSIS — F51.01 PRIMARY INSOMNIA: ICD-10-CM

## 2024-05-01 RX ORDER — ZOLPIDEM TARTRATE 10 MG/1
10 TABLET ORAL NIGHTLY PRN
Qty: 90 TABLET | Refills: 1 | Status: SHIPPED | OUTPATIENT
Start: 2024-05-03 | End: 2024-10-30

## 2024-05-01 NOTE — TELEPHONE ENCOUNTER
From: Reymundo Bocanegra  To: Dr. Reno Amador  Sent: 5/1/2024 9:39 AM EDT  Subject: Ambien 10    Could you send a refill (90) to [facility] pharmacy on [address]  I had total hip replacement 2 weeks ago and had to reschedule my appt with Dr. CARO  Many thanks  Aravind Bocanegra  [birth year]  [phone]

## 2024-05-01 NOTE — TELEPHONE ENCOUNTER
Pt requesting zolpidem 10 mg refills, please.    SCRIPTS last fill date: 2/3/24 for a 90 day supply.  Pended to fill on 5/3/24.    Last OV: 10/18/23  Next OV: 7/24/24

## 2024-07-12 DIAGNOSIS — E11.8 CONTROLLED TYPE 2 DIABETES MELLITUS WITH COMPLICATION, WITH LONG-TERM CURRENT USE OF INSULIN (HCC): Primary | ICD-10-CM

## 2024-07-12 DIAGNOSIS — E78.2 MIXED HYPERLIPIDEMIA: ICD-10-CM

## 2024-07-12 DIAGNOSIS — Z79.4 CONTROLLED TYPE 2 DIABETES MELLITUS WITH COMPLICATION, WITH LONG-TERM CURRENT USE OF INSULIN (HCC): Primary | ICD-10-CM

## 2024-07-19 ENCOUNTER — LAB (OUTPATIENT)
Dept: INTERNAL MEDICINE CLINIC | Facility: CLINIC | Age: 69
End: 2024-07-19

## 2024-07-19 DIAGNOSIS — Z79.4 CONTROLLED TYPE 2 DIABETES MELLITUS WITH COMPLICATION, WITH LONG-TERM CURRENT USE OF INSULIN (HCC): ICD-10-CM

## 2024-07-19 DIAGNOSIS — E11.8 CONTROLLED TYPE 2 DIABETES MELLITUS WITH COMPLICATION, WITH LONG-TERM CURRENT USE OF INSULIN (HCC): ICD-10-CM

## 2024-07-19 DIAGNOSIS — E78.2 MIXED HYPERLIPIDEMIA: ICD-10-CM

## 2024-07-19 LAB
ALBUMIN SERPL-MCNC: 3.8 G/DL (ref 3.2–4.6)
ALBUMIN/GLOB SERPL: 1.2 (ref 1–1.9)
ALP SERPL-CCNC: 74 U/L (ref 40–129)
ALT SERPL-CCNC: 16 U/L (ref 12–65)
ANION GAP SERPL CALC-SCNC: 11 MMOL/L (ref 9–18)
AST SERPL-CCNC: 21 U/L (ref 15–37)
BASOPHILS # BLD: 0 K/UL (ref 0–0.2)
BASOPHILS NFR BLD: 1 % (ref 0–2)
BILIRUB SERPL-MCNC: 0.7 MG/DL (ref 0–1.2)
BUN SERPL-MCNC: 16 MG/DL (ref 8–23)
CALCIUM SERPL-MCNC: 9.2 MG/DL (ref 8.8–10.2)
CHLORIDE SERPL-SCNC: 102 MMOL/L (ref 98–107)
CHOLEST SERPL-MCNC: 211 MG/DL (ref 0–200)
CO2 SERPL-SCNC: 26 MMOL/L (ref 20–28)
CREAT SERPL-MCNC: 0.95 MG/DL (ref 0.8–1.3)
DIFFERENTIAL METHOD BLD: ABNORMAL
EOSINOPHIL # BLD: 0.5 K/UL (ref 0–0.8)
EOSINOPHIL NFR BLD: 6 % (ref 0.5–7.8)
ERYTHROCYTE [DISTWIDTH] IN BLOOD BY AUTOMATED COUNT: 14.6 % (ref 11.9–14.6)
EST. AVERAGE GLUCOSE BLD GHB EST-MCNC: 126 MG/DL
GLOBULIN SER CALC-MCNC: 3.1 G/DL (ref 2.3–3.5)
GLUCOSE SERPL-MCNC: 101 MG/DL (ref 70–99)
HBA1C MFR BLD: 6 % (ref 0–5.6)
HCT VFR BLD AUTO: 45.9 % (ref 41.1–50.3)
HDLC SERPL-MCNC: 33 MG/DL (ref 40–60)
HDLC SERPL: 6.5 (ref 0–5)
HGB BLD-MCNC: 14.3 G/DL (ref 13.6–17.2)
IMM GRANULOCYTES # BLD AUTO: 0 K/UL (ref 0–0.5)
IMM GRANULOCYTES NFR BLD AUTO: 0 % (ref 0–5)
LDLC SERPL CALC-MCNC: 139 MG/DL (ref 0–100)
LYMPHOCYTES # BLD: 2.4 K/UL (ref 0.5–4.6)
LYMPHOCYTES NFR BLD: 32 % (ref 13–44)
MCH RBC QN AUTO: 27.9 PG (ref 26.1–32.9)
MCHC RBC AUTO-ENTMCNC: 31.2 G/DL (ref 31.4–35)
MCV RBC AUTO: 89.5 FL (ref 82–102)
MONOCYTES # BLD: 0.6 K/UL (ref 0.1–1.3)
MONOCYTES NFR BLD: 8 % (ref 4–12)
NEUTS SEG # BLD: 4 K/UL (ref 1.7–8.2)
NEUTS SEG NFR BLD: 53 % (ref 43–78)
NRBC # BLD: 0 K/UL (ref 0–0.2)
PLATELET # BLD AUTO: 233 K/UL (ref 150–450)
PMV BLD AUTO: 10.4 FL (ref 9.4–12.3)
POTASSIUM SERPL-SCNC: 4.7 MMOL/L (ref 3.5–5.1)
PROT SERPL-MCNC: 6.9 G/DL (ref 6.3–8.2)
RBC # BLD AUTO: 5.13 M/UL (ref 4.23–5.6)
SODIUM SERPL-SCNC: 139 MMOL/L (ref 136–145)
TRIGL SERPL-MCNC: 195 MG/DL (ref 0–150)
TSH, 3RD GENERATION: 1.13 UIU/ML (ref 0.27–4.2)
VLDLC SERPL CALC-MCNC: 39 MG/DL (ref 6–23)
WBC # BLD AUTO: 7.6 K/UL (ref 4.3–11.1)

## 2024-07-21 ASSESSMENT — LIFESTYLE VARIABLES
HOW OFTEN DO YOU HAVE A DRINK CONTAINING ALCOHOL: 1
HOW MANY STANDARD DRINKS CONTAINING ALCOHOL DO YOU HAVE ON A TYPICAL DAY: 0
HOW OFTEN DO YOU HAVE SIX OR MORE DRINKS ON ONE OCCASION: 1

## 2024-07-24 ENCOUNTER — OFFICE VISIT (OUTPATIENT)
Dept: INTERNAL MEDICINE CLINIC | Facility: CLINIC | Age: 69
End: 2024-07-24
Payer: MEDICARE

## 2024-07-24 VITALS
OXYGEN SATURATION: 95 % | HEART RATE: 57 BPM | SYSTOLIC BLOOD PRESSURE: 126 MMHG | HEIGHT: 69 IN | BODY MASS INDEX: 35.84 KG/M2 | WEIGHT: 242 LBS | TEMPERATURE: 97.5 F | DIASTOLIC BLOOD PRESSURE: 64 MMHG

## 2024-07-24 DIAGNOSIS — Z00.00 MEDICARE ANNUAL WELLNESS VISIT, SUBSEQUENT: Primary | ICD-10-CM

## 2024-07-24 DIAGNOSIS — Z12.11 SCREENING FOR COLON CANCER: ICD-10-CM

## 2024-07-24 DIAGNOSIS — F33.0 MAJOR DEPRESSIVE DISORDER, RECURRENT, MILD (HCC): ICD-10-CM

## 2024-07-24 DIAGNOSIS — T46.6X5A STATIN MYOPATHY: ICD-10-CM

## 2024-07-24 DIAGNOSIS — F41.9 ANXIETY: ICD-10-CM

## 2024-07-24 DIAGNOSIS — E66.01 SEVERE OBESITY (BMI 35.0-39.9) WITH COMORBIDITY (HCC): ICD-10-CM

## 2024-07-24 DIAGNOSIS — G72.0 STATIN MYOPATHY: ICD-10-CM

## 2024-07-24 DIAGNOSIS — I25.5 ISCHEMIC CARDIOMYOPATHY: ICD-10-CM

## 2024-07-24 DIAGNOSIS — E11.8 CONTROLLED TYPE 2 DIABETES MELLITUS WITH COMPLICATION, WITH LONG-TERM CURRENT USE OF INSULIN (HCC): ICD-10-CM

## 2024-07-24 DIAGNOSIS — Z79.4 CONTROLLED TYPE 2 DIABETES MELLITUS WITH COMPLICATION, WITH LONG-TERM CURRENT USE OF INSULIN (HCC): ICD-10-CM

## 2024-07-24 DIAGNOSIS — M48.062 SPINAL STENOSIS, LUMBAR REGION, WITH NEUROGENIC CLAUDICATION: ICD-10-CM

## 2024-07-24 DIAGNOSIS — E11.59 TYPE 2 DIABETES MELLITUS WITH OTHER CIRCULATORY COMPLICATIONS (HCC): ICD-10-CM

## 2024-07-24 DIAGNOSIS — F51.01 PRIMARY INSOMNIA: ICD-10-CM

## 2024-07-24 DIAGNOSIS — Z96.641 S/P TOTAL RIGHT HIP ARTHROPLASTY: ICD-10-CM

## 2024-07-24 DIAGNOSIS — I25.118 CORONARY ARTERY DISEASE OF NATIVE ARTERY OF NATIVE HEART WITH STABLE ANGINA PECTORIS (HCC): ICD-10-CM

## 2024-07-24 PROBLEM — I21.02 ST ELEVATION MYOCARDIAL INFARCTION INVOLVING LEFT ANTERIOR DESCENDING (LAD) CORONARY ARTERY (HCC): Status: RESOLVED | Noted: 2017-01-28 | Resolved: 2024-07-24

## 2024-07-24 PROBLEM — F33.1 MAJOR DEPRESSIVE DISORDER, RECURRENT, MODERATE (HCC): Status: RESOLVED | Noted: 2023-04-14 | Resolved: 2024-07-24

## 2024-07-24 PROBLEM — F33.9 MAJOR DEPRESSIVE DISORDER, RECURRENT, UNSPECIFIED (HCC): Status: RESOLVED | Noted: 2023-04-14 | Resolved: 2024-07-24

## 2024-07-24 PROCEDURE — 1036F TOBACCO NON-USER: CPT | Performed by: INTERNAL MEDICINE

## 2024-07-24 PROCEDURE — 99214 OFFICE O/P EST MOD 30 MIN: CPT | Performed by: INTERNAL MEDICINE

## 2024-07-24 PROCEDURE — 3017F COLORECTAL CA SCREEN DOC REV: CPT | Performed by: INTERNAL MEDICINE

## 2024-07-24 PROCEDURE — 3044F HG A1C LEVEL LT 7.0%: CPT | Performed by: INTERNAL MEDICINE

## 2024-07-24 PROCEDURE — 2022F DILAT RTA XM EVC RTNOPTHY: CPT | Performed by: INTERNAL MEDICINE

## 2024-07-24 PROCEDURE — 1123F ACP DISCUSS/DSCN MKR DOCD: CPT | Performed by: INTERNAL MEDICINE

## 2024-07-24 PROCEDURE — G0439 PPPS, SUBSEQ VISIT: HCPCS | Performed by: INTERNAL MEDICINE

## 2024-07-24 PROCEDURE — G8417 CALC BMI ABV UP PARAM F/U: HCPCS | Performed by: INTERNAL MEDICINE

## 2024-07-24 PROCEDURE — G8427 DOCREV CUR MEDS BY ELIG CLIN: HCPCS | Performed by: INTERNAL MEDICINE

## 2024-07-24 RX ORDER — CYANOCOBALAMIN 1000 UG/ML
1000 INJECTION, SOLUTION INTRAMUSCULAR; SUBCUTANEOUS
Qty: 10 ML | Refills: 1 | Status: SHIPPED | OUTPATIENT
Start: 2024-07-24

## 2024-07-24 RX ORDER — CLOPIDOGREL BISULFATE 75 MG/1
75 TABLET ORAL DAILY
Qty: 90 TABLET | Refills: 3 | Status: SHIPPED | OUTPATIENT
Start: 2024-07-24

## 2024-07-24 RX ORDER — ZOLPIDEM TARTRATE 10 MG/1
10 TABLET ORAL NIGHTLY PRN
Qty: 90 TABLET | Refills: 1 | Status: SHIPPED | OUTPATIENT
Start: 2024-07-24 | End: 2025-01-20

## 2024-07-24 RX ORDER — SPIRONOLACTONE 25 MG/1
25 TABLET ORAL DAILY
Qty: 90 TABLET | Refills: 3 | Status: SHIPPED | OUTPATIENT
Start: 2024-07-24

## 2024-07-24 RX ORDER — METOPROLOL SUCCINATE 50 MG/1
50 TABLET, EXTENDED RELEASE ORAL DAILY
Qty: 90 TABLET | Refills: 3 | Status: SHIPPED | OUTPATIENT
Start: 2024-07-24

## 2024-07-24 RX ORDER — PAROXETINE HYDROCHLORIDE 20 MG/1
20 TABLET, FILM COATED ORAL DAILY
Qty: 90 TABLET | Refills: 3 | Status: SHIPPED | OUTPATIENT
Start: 2024-07-24

## 2024-07-24 NOTE — ASSESSMENT & PLAN NOTE
His last colonoscopy on record review of the chart was 2014.  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  His preference is to not pursue colonoscopy unless absolutely needed.  He is agreeable with Cologuard screening.  He has had 3 prior colonoscopies and no prior history of polyps or family history of colon cancer.  If his Cologuard was positive, I would recommend he have a colonoscopy otherwise we will discuss again next year or if clinically indicated sooner.  A Cologuard is ordered

## 2024-07-24 NOTE — ASSESSMENT & PLAN NOTE
S/p lumbar disc surgery Oct 2023 with Dr. Mcneil.      S/p right DEBORAH and doing well.      Playing some tennis.  Pain much improved.   Ambulating better and pleased with surgical results.

## 2024-07-24 NOTE — ASSESSMENT & PLAN NOTE
Lab Results   Component Value Date/Time     07/19/2024 08:28 AM    K 4.7 07/19/2024 08:28 AM     07/19/2024 08:28 AM    CO2 26 07/19/2024 08:28 AM    BUN 16 07/19/2024 08:28 AM    CREATININE 0.95 07/19/2024 08:28 AM    GLUCOSE 101 07/19/2024 08:28 AM    CALCIUM 9.2 07/19/2024 08:28 AM    LABGLOM 87 07/19/2024 08:28 AM    LABGLOM >60 10/18/2023 08:03 AM      Hemoglobin A1C   Date Value Ref Range Status   07/19/2024 6.0 (H) 0 - 5.6 % Final     Comment:     Reference Range  Normal       <5.7%  Prediabetes  5.7-6.4%  Diabetes     >6.4%

## 2024-07-24 NOTE — PATIENT INSTRUCTIONS
Quitting is one of the most important things you can do to protect your heart. It is never too late to quit. Try to avoid secondhand smoke too.     Stay at a weight that's healthy for you. Talk to your doctor if you need help losing weight.     Try to get 7 to 9 hours of sleep each night.     Limit alcohol to 2 drinks a day for men and 1 drink a day for women. Too much alcohol can cause health problems.     Manage other health problems such as diabetes, high blood pressure, and high cholesterol. If you think you may have a problem with alcohol or drug use, talk to your doctor.   Medicines    Take your medicines exactly as prescribed. Call your doctor if you think you are having a problem with your medicine.     If your doctor recommends aspirin, take the amount directed each day. Make sure you take aspirin and not another kind of pain reliever, such as acetaminophen (Tylenol).   When should you call for help?   Call 911 if you have symptoms of a heart attack. These may include:    Chest pain or pressure, or a strange feeling in the chest.     Sweating.     Shortness of breath.     Pain, pressure, or a strange feeling in the back, neck, jaw, or upper belly or in one or both shoulders or arms.     Lightheadedness or sudden weakness.     A fast or irregular heartbeat.   After you call 911, the  may tell you to chew 1 adult-strength or 2 to 4 low-dose aspirin. Wait for an ambulance. Do not try to drive yourself.  Watch closely for changes in your health, and be sure to contact your doctor if you have any problems.  Where can you learn more?  Go to https://www.[employer].net/patientEd and enter F075 to learn more about \"A Healthy Heart: Care Instructions.\"  Current as of: June 24, 2023  Content Version: 14.1  © 4305-8124 Healthwise, Incorporated.   Care instructions adapted under license by [employer]. If you have questions about a medical condition or this instruction, always ask your healthcare professional.

## 2024-07-24 NOTE — PROGRESS NOTES
Medicare Annual Wellness Visit    Reymundo Bocanegra is here for Medicare AWV (Lab review), Diabetes, Familial Hypercholesterolemia, and Insomnia    Assessment & Plan   Medicare annual wellness visit, subsequent  Coronary artery disease of native artery of native heart with stable angina pectoris ([facility])  -     clopidogrel (PLAVIX) 75 MG tablet; Take 1 tablet by mouth daily, Disp-90 tablet, R-3Normal  -     metoprolol succinate (TOPROL XL) 50 MG extended release tablet; Take 1 tablet by mouth daily, Disp-90 tablet, R-3Normal  Controlled type 2 diabetes mellitus with complication, with long-term current use of insulin ([facility])  Assessment & Plan:     Lab Results   Component Value Date/Time     07/19/2024 08:28 AM    K 4.7 07/19/2024 08:28 AM     07/19/2024 08:28 AM    CO2 26 07/19/2024 08:28 AM    BUN 16 07/19/2024 08:28 AM    CREATININE 0.95 07/19/2024 08:28 AM    GLUCOSE 101 07/19/2024 08:28 AM    CALCIUM 9.2 07/19/2024 08:28 AM    LABGLOM 87 07/19/2024 08:28 AM    LABGLOM >60 10/18/2023 08:03 AM      Hemoglobin A1C   Date Value Ref Range Status   07/19/2024 6.0 (H) 0 - 5.6 % Final     Comment:     Reference Range  Normal       <5.7%  Prediabetes  5.7-6.4%  Diabetes     >6.4%         Orders:  -     empagliflozin (JARDIANCE) 10 MG tablet; Take 1 tablet by mouth daily, Disp-90 tablet, R-3Normal  Ischemic cardiomyopathy  -     metoprolol succinate (TOPROL XL) 50 MG extended release tablet; Take 1 tablet by mouth daily, Disp-90 tablet, R-3Normal  -     sacubitril-valsartan (ENTRESTO) 49-51 MG per tablet; Take 1 tablet by mouth 2 times daily, Disp-180 tablet, R-3Normal  -     spironolactone (ALDACTONE) 25 MG tablet; Take 1 tablet by mouth daily, Disp-90 tablet, R-3Normal  Anxiety  -     PARoxetine (PAXIL) 20 MG tablet; Take 1 tablet by mouth daily, Disp-90 tablet, R-3Normal  Primary insomnia  -     zolpidem (AMBIEN) 10 MG tablet; Take 1 tablet by mouth nightly as needed for Sleep for up to 180 days. Max Daily

## 2024-07-24 NOTE — ASSESSMENT & PLAN NOTE
Allergy to statin medication. Statin myopathy.    Lab Results   Component Value Date    CHOL 211 (H) 07/19/2024    TRIG 195 (H) 07/19/2024    HDL 33 (L) 07/19/2024     (H) 07/19/2024    VLDL 39 (H) 07/19/2024    CHOLHDLRATIO 6.5 (H) 07/19/2024

## 2024-08-05 LAB — NONINV COLON CA DNA+OCC BLD SCRN STL QL: NEGATIVE

## 2024-08-23 PROBLEM — Z12.11 SCREENING FOR COLON CANCER: Status: RESOLVED | Noted: 2024-07-24 | Resolved: 2024-08-23

## 2025-02-05 ENCOUNTER — PATIENT MESSAGE (OUTPATIENT)
Dept: INTERNAL MEDICINE CLINIC | Facility: CLINIC | Age: 70
End: 2025-02-05

## 2025-02-05 ENCOUNTER — TELEPHONE (OUTPATIENT)
Dept: INTERNAL MEDICINE CLINIC | Facility: CLINIC | Age: 70
End: 2025-02-05

## 2025-02-05 DIAGNOSIS — F51.01 PRIMARY INSOMNIA: Primary | ICD-10-CM

## 2025-02-05 DIAGNOSIS — F51.01 PRIMARY INSOMNIA: ICD-10-CM

## 2025-02-05 RX ORDER — ZOLPIDEM TARTRATE 10 MG/1
10 TABLET ORAL NIGHTLY PRN
Qty: 90 TABLET | Refills: 0 | Status: SHIPPED | OUTPATIENT
Start: 2025-02-05 | End: 2025-05-06

## 2025-02-05 RX ORDER — ZOLPIDEM TARTRATE 10 MG/1
10 TABLET ORAL NIGHTLY PRN
Qty: 90 TABLET | Refills: 1 | Status: SHIPPED | OUTPATIENT
Start: 2025-02-05 | End: 2025-08-04

## 2025-02-05 NOTE — TELEPHONE ENCOUNTER
----- Message from Luis Alfredo DAMON sent at 2/5/2025  3:39 PM EST -----  Regarding: ECC Message to Provider  ECC Message to Provider    Relationship to Patient: Covered Entity [facility]    Additional Information The covered entity wanted the patient pcp to call them because they sent an ambien prescription 2 times and they dont know which one to use   --------------------------------------------------------------------------------------------------------------------------    Call Back Information: Do not leave any message, patient will call back for answer  Preferred Call Back Number: Phone [phone]

## 2025-02-05 NOTE — TELEPHONE ENCOUNTER
Pt requesting refill on zolpidem 10 mg, please.    SCRIPTS last fill date: 11/5/24 for a 90-day supply.  Pended to fill today.    Last visit: 7/24/24  Next visit: 7/30/25    Controlled Substance Monitoring:    Acute and Chronic Pain Monitoring:   RX Monitoring Periodic Controlled Substance Monitoring   5/1/2024   6:54 PM No signs of potential drug abuse or diversion identified.

## 2025-02-05 NOTE — TELEPHONE ENCOUNTER
Medication Refill Request    Name of Medication : zolpidem (AMBIEN)    Strength of Medication: 10 MG    Directions: nightly    30 day or 90 day supply: 30 day (90 tablets)    Preferred Pharmacy: [address]    Last Appt. Date: 7/24/2024    Next Appt. Date: 7/30/2025    Additional Information For Provider:

## 2025-02-12 NOTE — TELEPHONE ENCOUNTER
Notified [facility] to fill Ambien prescription that had one refill on it. Pharmacist voiced understanding.

## 2025-06-27 ENCOUNTER — OFFICE VISIT (OUTPATIENT)
Dept: INTERNAL MEDICINE CLINIC | Facility: CLINIC | Age: 70
End: 2025-06-27

## 2025-06-27 VITALS
BODY MASS INDEX: 35.1 KG/M2 | HEIGHT: 69 IN | HEART RATE: 60 BPM | DIASTOLIC BLOOD PRESSURE: 70 MMHG | OXYGEN SATURATION: 94 % | TEMPERATURE: 97.3 F | WEIGHT: 237 LBS | SYSTOLIC BLOOD PRESSURE: 120 MMHG

## 2025-06-27 DIAGNOSIS — R06.83 SNORING: ICD-10-CM

## 2025-06-27 DIAGNOSIS — G72.0 STATIN MYOPATHY: ICD-10-CM

## 2025-06-27 DIAGNOSIS — E78.2 MIXED HYPERLIPIDEMIA: ICD-10-CM

## 2025-06-27 DIAGNOSIS — Z12.5 SCREENING FOR MALIGNANT NEOPLASM OF PROSTATE: ICD-10-CM

## 2025-06-27 DIAGNOSIS — T46.6X5A STATIN MYOPATHY: ICD-10-CM

## 2025-06-27 DIAGNOSIS — E11.59 TYPE 2 DIABETES MELLITUS WITH OTHER CIRCULATORY COMPLICATIONS (HCC): ICD-10-CM

## 2025-06-27 DIAGNOSIS — I63.9 ACUTE CVA (CEREBROVASCULAR ACCIDENT) (HCC): Primary | ICD-10-CM

## 2025-06-27 DIAGNOSIS — Z78.9 STATIN INTOLERANCE: ICD-10-CM

## 2025-06-27 DIAGNOSIS — I25.118 CORONARY ARTERY DISEASE OF NATIVE ARTERY OF NATIVE HEART WITH STABLE ANGINA PECTORIS: ICD-10-CM

## 2025-06-27 SDOH — ECONOMIC STABILITY: FOOD INSECURITY: WITHIN THE PAST 12 MONTHS, THE FOOD YOU BOUGHT JUST DIDN'T LAST AND YOU DIDN'T HAVE MONEY TO GET MORE.: NEVER TRUE

## 2025-06-27 SDOH — ECONOMIC STABILITY: FOOD INSECURITY: WITHIN THE PAST 12 MONTHS, YOU WORRIED THAT YOUR FOOD WOULD RUN OUT BEFORE YOU GOT MONEY TO BUY MORE.: NEVER TRUE

## 2025-06-27 ASSESSMENT — PATIENT HEALTH QUESTIONNAIRE - PHQ9
10. IF YOU CHECKED OFF ANY PROBLEMS, HOW DIFFICULT HAVE THESE PROBLEMS MADE IT FOR YOU TO DO YOUR WORK, TAKE CARE OF THINGS AT HOME, OR GET ALONG WITH OTHER PEOPLE: NOT DIFFICULT AT ALL
3. TROUBLE FALLING OR STAYING ASLEEP: NOT AT ALL
5. POOR APPETITE OR OVEREATING: NOT AT ALL
9. THOUGHTS THAT YOU WOULD BE BETTER OFF DEAD, OR OF HURTING YOURSELF: NOT AT ALL
1. LITTLE INTEREST OR PLEASURE IN DOING THINGS: NOT AT ALL
7. TROUBLE CONCENTRATING ON THINGS, SUCH AS READING THE NEWSPAPER OR WATCHING TELEVISION: NOT AT ALL
4. FEELING TIRED OR HAVING LITTLE ENERGY: NOT AT ALL
2. FEELING DOWN, DEPRESSED OR HOPELESS: NOT AT ALL
SUM OF ALL RESPONSES TO PHQ QUESTIONS 1-9: 0
6. FEELING BAD ABOUT YOURSELF - OR THAT YOU ARE A FAILURE OR HAVE LET YOURSELF OR YOUR FAMILY DOWN: NOT AT ALL
8. MOVING OR SPEAKING SO SLOWLY THAT OTHER PEOPLE COULD HAVE NOTICED. OR THE OPPOSITE, BEING SO FIGETY OR RESTLESS THAT YOU HAVE BEEN MOVING AROUND A LOT MORE THAN USUAL: NOT AT ALL
SUM OF ALL RESPONSES TO PHQ QUESTIONS 1-9: 0

## 2025-06-27 NOTE — PROGRESS NOTES
[facility]      2025    Patient Name: Reymundo Bocanegra  :  1955      Chief Complaint:  Chief Complaint   Patient presents with    Follow-up     ER         HPI  Patient presents today for follow-up on recent ER visit.  He was seen in the ER at [facility] on 2025 with complaint of left hand weakness.  He also reported that he may have had an extremely brief period (he estimates less than 10 seconds) of dysarthria.  CT of the head showed no evidence of acute intracranial abnormality.  CTA of the head and neck showed no evidence of an acute arterial abnormality in the head or neck.  Symptoms felt to be due to a peripheral nerve issue, but because of the questionable brief period of dysarthria, there was discussion about admitting him for an MRI.  Patient ultimately decided to be discharged home with outpatient MRI.  Discharged home in stable condition. MRI completed 25 showing \"Small area of diffusion restriction in the right perirolandic region is compatible with acute infarction and notably involves portions of the right precentral (including in the region of the hand knob) and postcentral gyri. There is some faint susceptibility that may reflect in mild petechial hemorrhage (HI 1) \". He was contacted later that day by [facility] neurology to notify of results.  History of CAD with PCI to the LAD; ischemic cardiomyopathy. He was instructed to continue aspirin and Plavix.  His LDL in  was 139. He has previously tried multiple statins including atorvastatin, simvastatin, rosuvastatin, Zetia and all of these medications cause muscle aches and myalgias.  He has previously tolerated Repatha, and lipids were excellent on the medication.  However he reports that when he got on Medicare, insurance stopped paying for the medication. Since being off Repatha/PCSK9, LDL has been poorly controlled.     He had a 30-day cardiac event monitor placed yesterday.  Echocardiogram updated

## 2025-06-28 ASSESSMENT — ENCOUNTER SYMPTOMS
COUGH: 0
ABDOMINAL PAIN: 0
VOMITING: 0
TROUBLE SWALLOWING: 0
NAUSEA: 0
SHORTNESS OF BREATH: 0
WHEEZING: 0

## 2025-06-30 DIAGNOSIS — I63.9 ACUTE CVA (CEREBROVASCULAR ACCIDENT) (HCC): Primary | ICD-10-CM

## 2025-06-30 DIAGNOSIS — R06.83 SNORING: ICD-10-CM

## 2025-07-07 ENCOUNTER — TELEPHONE (OUTPATIENT)
Dept: INTERNAL MEDICINE CLINIC | Facility: CLINIC | Age: 70
End: 2025-07-07

## 2025-07-11 NOTE — TELEPHONE ENCOUNTER
Pharmacy called and stated that they are not seeing the PA on their end that it is still saying PA required

## 2025-07-11 NOTE — TELEPHONE ENCOUNTER
Processed PA on CMM and [employer] states that med does not require a PA. Pharmacy notified and re-processed RX and is now going through pt's insurance.

## 2025-07-11 NOTE — TELEPHONE ENCOUNTER
Pharmacy called back stating that the PA needs to be ran through [employer] ([employer] plan) with member ID [id number].   BIN: [id number]   PCN: [id number]   GRP: [id number]    PA initiated on CMM through [employer] for [employer]. CMM Key: [id number]. Waiting on determination.

## 2025-07-12 ENCOUNTER — HOSPITAL ENCOUNTER (OUTPATIENT)
Dept: SLEEP CENTER | Age: 70
Discharge: HOME | End: 2025-07-14
Payer: MEDICARE

## 2025-07-12 PROCEDURE — 95810 POLYSOM 6/> YRS 4/> PARAM: CPT

## 2025-07-16 DIAGNOSIS — T46.6X5A STATIN MYOPATHY: ICD-10-CM

## 2025-07-16 DIAGNOSIS — E11.59 TYPE 2 DIABETES MELLITUS WITH OTHER CIRCULATORY COMPLICATIONS (HCC): ICD-10-CM

## 2025-07-16 DIAGNOSIS — E78.2 MIXED HYPERLIPIDEMIA: ICD-10-CM

## 2025-07-16 DIAGNOSIS — G72.0 STATIN MYOPATHY: ICD-10-CM

## 2025-07-16 DIAGNOSIS — I63.9 ACUTE CVA (CEREBROVASCULAR ACCIDENT) (HCC): ICD-10-CM

## 2025-07-16 DIAGNOSIS — Z78.9 STATIN INTOLERANCE: ICD-10-CM

## 2025-07-16 DIAGNOSIS — I25.118 CORONARY ARTERY DISEASE OF NATIVE ARTERY OF NATIVE HEART WITH STABLE ANGINA PECTORIS: ICD-10-CM

## 2025-07-25 ENCOUNTER — LAB (OUTPATIENT)
Dept: INTERNAL MEDICINE CLINIC | Facility: CLINIC | Age: 70
End: 2025-07-25

## 2025-07-25 DIAGNOSIS — Z12.5 SCREENING FOR MALIGNANT NEOPLASM OF PROSTATE: ICD-10-CM

## 2025-07-25 DIAGNOSIS — E78.2 MIXED HYPERLIPIDEMIA: ICD-10-CM

## 2025-07-25 DIAGNOSIS — E11.59 TYPE 2 DIABETES MELLITUS WITH OTHER CIRCULATORY COMPLICATIONS (HCC): ICD-10-CM

## 2025-07-25 LAB
ALBUMIN SERPL-MCNC: 3.7 G/DL (ref 3.2–4.6)
ALBUMIN/GLOB SERPL: 1.1 (ref 1–1.9)
ALP SERPL-CCNC: 59 U/L (ref 40–129)
ALT SERPL-CCNC: 22 U/L (ref 8–55)
ANION GAP SERPL CALC-SCNC: 11 MMOL/L (ref 7–16)
AST SERPL-CCNC: 18 U/L (ref 15–37)
BASOPHILS # BLD: 0.04 K/UL (ref 0–0.2)
BASOPHILS NFR BLD: 0.5 % (ref 0–2)
BILIRUB SERPL-MCNC: 0.5 MG/DL (ref 0–1.2)
BUN SERPL-MCNC: 19 MG/DL (ref 8–23)
CALCIUM SERPL-MCNC: 9.6 MG/DL (ref 8.8–10.2)
CHLORIDE SERPL-SCNC: 104 MMOL/L (ref 98–107)
CHOLEST SERPL-MCNC: 219 MG/DL (ref 0–200)
CO2 SERPL-SCNC: 25 MMOL/L (ref 20–29)
CREAT SERPL-MCNC: 1.03 MG/DL (ref 0.8–1.3)
CREAT UR-MCNC: 166 MG/DL (ref 39–259)
DIFFERENTIAL METHOD BLD: NORMAL
EOSINOPHIL # BLD: 0.49 K/UL (ref 0–0.8)
EOSINOPHIL NFR BLD: 6.6 % (ref 0.5–7.8)
ERYTHROCYTE [DISTWIDTH] IN BLOOD BY AUTOMATED COUNT: 13.5 % (ref 11.9–14.6)
EST. AVERAGE GLUCOSE BLD GHB EST-MCNC: 128 MG/DL
GLOBULIN SER CALC-MCNC: 3.4 G/DL (ref 2.3–3.5)
GLUCOSE SERPL-MCNC: 103 MG/DL (ref 70–99)
HBA1C MFR BLD: 6.1 % (ref 0–5.6)
HCT VFR BLD AUTO: 46.3 % (ref 41.1–50.3)
HDLC SERPL-MCNC: 31 MG/DL (ref 40–60)
HDLC SERPL: 7 (ref 0–5)
HGB BLD-MCNC: 14.7 G/DL (ref 13.6–17.2)
IMM GRANULOCYTES # BLD AUTO: 0.02 K/UL (ref 0–0.5)
IMM GRANULOCYTES NFR BLD AUTO: 0.3 % (ref 0–5)
LDLC SERPL CALC-MCNC: 139 MG/DL (ref 0–100)
LYMPHOCYTES # BLD: 2.29 K/UL (ref 0.5–4.6)
LYMPHOCYTES NFR BLD: 30.7 % (ref 13–44)
MCH RBC QN AUTO: 29.2 PG (ref 26.1–32.9)
MCHC RBC AUTO-ENTMCNC: 31.7 G/DL (ref 31.4–35)
MCV RBC AUTO: 91.9 FL (ref 82–102)
MICROALBUMIN UR-MCNC: <1.2 MG/DL (ref 0–20)
MICROALBUMIN/CREAT UR-RTO: NORMAL MG/G (ref 0–30)
MONOCYTES # BLD: 0.47 K/UL (ref 0.1–1.3)
MONOCYTES NFR BLD: 6.3 % (ref 4–12)
NEUTS SEG # BLD: 4.14 K/UL (ref 1.7–8.2)
NEUTS SEG NFR BLD: 55.6 % (ref 43–78)
NRBC # BLD: 0 K/UL (ref 0–0.2)
PLATELET # BLD AUTO: 201 K/UL (ref 150–450)
PMV BLD AUTO: 10.7 FL (ref 9.4–12.3)
POTASSIUM SERPL-SCNC: 4.6 MMOL/L (ref 3.5–5.1)
PROT SERPL-MCNC: 7.1 G/DL (ref 6.3–8.2)
PSA SERPL-MCNC: 0.6 NG/ML (ref 0–4)
RBC # BLD AUTO: 5.04 M/UL (ref 4.23–5.6)
SODIUM SERPL-SCNC: 140 MMOL/L (ref 136–145)
TRIGL SERPL-MCNC: 243 MG/DL (ref 0–150)
TSH, 3RD GENERATION: 0.83 UIU/ML (ref 0.27–4.2)
VLDLC SERPL CALC-MCNC: 49 MG/DL (ref 6–23)
WBC # BLD AUTO: 7.5 K/UL (ref 4.3–11.1)

## 2025-07-27 SDOH — HEALTH STABILITY: PHYSICAL HEALTH: ON AVERAGE, HOW MANY MINUTES DO YOU ENGAGE IN EXERCISE AT THIS LEVEL?: 60 MIN

## 2025-07-27 SDOH — HEALTH STABILITY: PHYSICAL HEALTH: ON AVERAGE, HOW MANY DAYS PER WEEK DO YOU ENGAGE IN MODERATE TO STRENUOUS EXERCISE (LIKE A BRISK WALK)?: 5 DAYS

## 2025-07-27 ASSESSMENT — PATIENT HEALTH QUESTIONNAIRE - PHQ9
SUM OF ALL RESPONSES TO PHQ QUESTIONS 1-9: 0
SUM OF ALL RESPONSES TO PHQ QUESTIONS 1-9: 0
2. FEELING DOWN, DEPRESSED OR HOPELESS: NOT AT ALL
SUM OF ALL RESPONSES TO PHQ QUESTIONS 1-9: 0
1. LITTLE INTEREST OR PLEASURE IN DOING THINGS: NOT AT ALL
SUM OF ALL RESPONSES TO PHQ QUESTIONS 1-9: 0

## 2025-07-27 ASSESSMENT — LIFESTYLE VARIABLES
HOW MANY STANDARD DRINKS CONTAINING ALCOHOL DO YOU HAVE ON A TYPICAL DAY: 0
HOW OFTEN DO YOU HAVE SIX OR MORE DRINKS ON ONE OCCASION: 1
HOW MANY STANDARD DRINKS CONTAINING ALCOHOL DO YOU HAVE ON A TYPICAL DAY: PATIENT DOES NOT DRINK
HOW OFTEN DO YOU HAVE A DRINK CONTAINING ALCOHOL: NEVER
HOW OFTEN DO YOU HAVE A DRINK CONTAINING ALCOHOL: 1

## 2025-07-30 ENCOUNTER — OFFICE VISIT (OUTPATIENT)
Dept: INTERNAL MEDICINE CLINIC | Facility: CLINIC | Age: 70
End: 2025-07-30

## 2025-07-30 ENCOUNTER — TELEPHONE (OUTPATIENT)
Age: 70
End: 2025-07-30

## 2025-07-30 ENCOUNTER — TELEMEDICINE (OUTPATIENT)
Dept: SLEEP MEDICINE | Age: 70
End: 2025-07-30
Payer: MEDICARE

## 2025-07-30 VITALS
TEMPERATURE: 97.9 F | SYSTOLIC BLOOD PRESSURE: 114 MMHG | DIASTOLIC BLOOD PRESSURE: 70 MMHG | HEART RATE: 55 BPM | BODY MASS INDEX: 34.96 KG/M2 | WEIGHT: 236 LBS | HEIGHT: 69 IN | OXYGEN SATURATION: 97 %

## 2025-07-30 DIAGNOSIS — E78.01 FAMILIAL HYPERCHOLESTEROLEMIA: ICD-10-CM

## 2025-07-30 DIAGNOSIS — I25.10 ATHEROSCLEROSIS OF NATIVE CORONARY ARTERY OF NATIVE HEART WITHOUT ANGINA PECTORIS: ICD-10-CM

## 2025-07-30 DIAGNOSIS — E11.59 TYPE 2 DIABETES MELLITUS WITH OTHER CIRCULATORY COMPLICATIONS (HCC): ICD-10-CM

## 2025-07-30 DIAGNOSIS — Z23 NEED FOR PROPHYLACTIC VACCINATION AGAINST STREPTOCOCCUS PNEUMONIAE (PNEUMOCOCCUS): ICD-10-CM

## 2025-07-30 DIAGNOSIS — I25.5 ISCHEMIC CARDIOMYOPATHY: ICD-10-CM

## 2025-07-30 DIAGNOSIS — T46.6X5A STATIN MYOPATHY: ICD-10-CM

## 2025-07-30 DIAGNOSIS — I69.30 HISTORY OF CVA WITH RESIDUAL DEFICIT: ICD-10-CM

## 2025-07-30 DIAGNOSIS — E11.8 CONTROLLED TYPE 2 DIABETES MELLITUS WITH COMPLICATION, WITH LONG-TERM CURRENT USE OF INSULIN (HCC): ICD-10-CM

## 2025-07-30 DIAGNOSIS — G47.34 NOCTURNAL HYPOXEMIA: ICD-10-CM

## 2025-07-30 DIAGNOSIS — Z87.891 PERSONAL HISTORY OF TOBACCO USE, PRESENTING HAZARDS TO HEALTH: ICD-10-CM

## 2025-07-30 DIAGNOSIS — F51.01 PRIMARY INSOMNIA: ICD-10-CM

## 2025-07-30 DIAGNOSIS — Z13.6 SCREENING FOR CARDIOVASCULAR CONDITION: ICD-10-CM

## 2025-07-30 DIAGNOSIS — I25.118 CORONARY ARTERY DISEASE OF NATIVE ARTERY OF NATIVE HEART WITH STABLE ANGINA PECTORIS: ICD-10-CM

## 2025-07-30 DIAGNOSIS — G47.33 OSA (OBSTRUCTIVE SLEEP APNEA): Primary | ICD-10-CM

## 2025-07-30 DIAGNOSIS — F41.9 ANXIETY: ICD-10-CM

## 2025-07-30 DIAGNOSIS — Z23 NEED FOR PROPHYLACTIC VACCINATION AGAINST DIPHTHERIA-TETANUS-PERTUSSIS (DTP): ICD-10-CM

## 2025-07-30 DIAGNOSIS — G72.0 STATIN MYOPATHY: ICD-10-CM

## 2025-07-30 DIAGNOSIS — Z00.00 MEDICARE ANNUAL WELLNESS VISIT, SUBSEQUENT: Primary | ICD-10-CM

## 2025-07-30 DIAGNOSIS — Z13.6 SCREENING FOR AAA (ABDOMINAL AORTIC ANEURYSM): ICD-10-CM

## 2025-07-30 DIAGNOSIS — Z79.4 CONTROLLED TYPE 2 DIABETES MELLITUS WITH COMPLICATION, WITH LONG-TERM CURRENT USE OF INSULIN (HCC): ICD-10-CM

## 2025-07-30 PROCEDURE — 99204 OFFICE O/P NEW MOD 45 MIN: CPT | Performed by: STUDENT IN AN ORGANIZED HEALTH CARE EDUCATION/TRAINING PROGRAM

## 2025-07-30 RX ORDER — CLOPIDOGREL BISULFATE 75 MG/1
75 TABLET ORAL DAILY
Qty: 90 TABLET | Refills: 3 | Status: SHIPPED | OUTPATIENT
Start: 2025-07-30

## 2025-07-30 RX ORDER — CYANOCOBALAMIN 1000 UG/ML
1000 INJECTION, SOLUTION INTRAMUSCULAR; SUBCUTANEOUS
Qty: 10 ML | Refills: 1 | Status: SHIPPED | OUTPATIENT
Start: 2025-07-30

## 2025-07-30 RX ORDER — SPIRONOLACTONE 25 MG/1
25 TABLET ORAL DAILY
Qty: 90 TABLET | Refills: 3 | Status: SHIPPED | OUTPATIENT
Start: 2025-07-30

## 2025-07-30 RX ORDER — SACUBITRIL AND VALSARTAN 49; 51 MG/1; MG/1
1 TABLET, FILM COATED ORAL 2 TIMES DAILY
Qty: 180 TABLET | Refills: 3 | Status: SHIPPED | OUTPATIENT
Start: 2025-07-30

## 2025-07-30 RX ORDER — PAROXETINE 20 MG/1
20 TABLET, FILM COATED ORAL DAILY
Qty: 90 TABLET | Refills: 3 | Status: SHIPPED | OUTPATIENT
Start: 2025-07-30

## 2025-07-30 RX ORDER — RESPIRATORY SYNCYTIAL VISUS VACCINE RECOMBINANT, ADJUVANTED 120MCG/0.5
0.5 KIT INTRAMUSCULAR ONCE
Qty: 0.5 ML | Refills: 0 | Status: SHIPPED | OUTPATIENT
Start: 2025-07-30 | End: 2025-07-30

## 2025-07-30 RX ORDER — METOPROLOL SUCCINATE 50 MG/1
50 TABLET, EXTENDED RELEASE ORAL DAILY
Qty: 90 TABLET | Refills: 3 | Status: SHIPPED | OUTPATIENT
Start: 2025-07-30

## 2025-07-30 RX ORDER — ZOLPIDEM TARTRATE 10 MG/1
10 TABLET ORAL NIGHTLY PRN
Qty: 90 TABLET | Refills: 1 | Status: SHIPPED | OUTPATIENT
Start: 2025-07-30 | End: 2026-01-26

## 2025-07-30 ASSESSMENT — PATIENT HEALTH QUESTIONNAIRE - PHQ9
10. IF YOU CHECKED OFF ANY PROBLEMS, HOW DIFFICULT HAVE THESE PROBLEMS MADE IT FOR YOU TO DO YOUR WORK, TAKE CARE OF THINGS AT HOME, OR GET ALONG WITH OTHER PEOPLE: NOT DIFFICULT AT ALL
SUM OF ALL RESPONSES TO PHQ QUESTIONS 1-9: 0
5. POOR APPETITE OR OVEREATING: NOT AT ALL
1. LITTLE INTEREST OR PLEASURE IN DOING THINGS: NOT AT ALL
6. FEELING BAD ABOUT YOURSELF - OR THAT YOU ARE A FAILURE OR HAVE LET YOURSELF OR YOUR FAMILY DOWN: NOT AT ALL
8. MOVING OR SPEAKING SO SLOWLY THAT OTHER PEOPLE COULD HAVE NOTICED. OR THE OPPOSITE, BEING SO FIGETY OR RESTLESS THAT YOU HAVE BEEN MOVING AROUND A LOT MORE THAN USUAL: NOT AT ALL
SUM OF ALL RESPONSES TO PHQ QUESTIONS 1-9: 0
9. THOUGHTS THAT YOU WOULD BE BETTER OFF DEAD, OR OF HURTING YOURSELF: NOT AT ALL
4. FEELING TIRED OR HAVING LITTLE ENERGY: NOT AT ALL
SUM OF ALL RESPONSES TO PHQ QUESTIONS 1-9: 0
2. FEELING DOWN, DEPRESSED OR HOPELESS: NOT AT ALL
SUM OF ALL RESPONSES TO PHQ QUESTIONS 1-9: 0
3. TROUBLE FALLING OR STAYING ASLEEP: NOT AT ALL
7. TROUBLE CONCENTRATING ON THINGS, SUCH AS READING THE NEWSPAPER OR WATCHING TELEVISION: NOT AT ALL

## 2025-07-30 ASSESSMENT — SLEEP AND FATIGUE QUESTIONNAIRES
HOW LIKELY ARE YOU TO NOD OFF OR FALL ASLEEP WHILE LYING DOWN TO REST IN THE AFTERNOON WHEN CIRCUMSTANCES PERMIT: WOULD NEVER DOZE
HOW LIKELY ARE YOU TO NOD OFF OR FALL ASLEEP WHILE SITTING AND READING: WOULD NEVER DOZE
HOW LIKELY ARE YOU TO NOD OFF OR FALL ASLEEP WHILE SITTING INACTIVE IN A PUBLIC PLACE: WOULD NEVER DOZE
HOW LIKELY ARE YOU TO NOD OFF OR FALL ASLEEP WHILE SITTING AND TALKING TO SOMEONE: WOULD NEVER DOZE
HOW LIKELY ARE YOU TO NOD OFF OR FALL ASLEEP WHILE SITTING QUIETLY AFTER LUNCH WITHOUT ALCOHOL: WOULD NEVER DOZE
ESS TOTAL SCORE: 1
HOW LIKELY ARE YOU TO NOD OFF OR FALL ASLEEP IN A CAR, WHILE STOPPED FOR A FEW MINUTES IN TRAFFIC: WOULD NEVER DOZE
HOW LIKELY ARE YOU TO NOD OFF OR FALL ASLEEP WHILE WATCHING TV: SLIGHT CHANCE OF DOZING
HOW LIKELY ARE YOU TO NOD OFF OR FALL ASLEEP WHEN YOU ARE A PASSENGER IN A CAR FOR AN HOUR WITHOUT A BREAK: WOULD NEVER DOZE

## 2025-07-30 NOTE — PROGRESS NOTES
[facility]  [address]  [place], [place] [id number] [phone]    Patient Name:  Reymundo Bocanegra  YOB: 1955    Reymundo Bocanegra, was evaluated through a synchronous (real-time) audio-video encounter. The patient (or guardian if applicable) is aware that this is a billable service, which includes applicable co-pays. This Virtual Visit was conducted with patient's (and/or legal guardian's) consent. Patient identification was verified, and a caregiver was present when appropriate.   The patient was located at Home: [address] [place] [id number]  Provider was located at Facility (Appt Dept): [address]  [place] [address]  Confirm you are appropriately licensed, registered, or certified to deliver care in the state where the patient is located as indicated above. If you are not or unsure, please re-schedule the visit: Yes, I confirm.       --BALBIR Costa CNP on 7/31/2025 at 1:19 PM    An electronic signature was used to authenticate this note.    Services were provided through a video synchronous discussion virtually to substitute for in-person clinic visit.    Reymundo Bocanegra is a 70 y.o. male who was seen by synchronous (real-time) audio-video technology on 7/30/2025.        Office Visit 7/31/2025    The patient (or guardian, if applicable) and other individuals in attendance with the patient were advised that Artificial Intelligence will be utilized during this visit to record, process the conversation to generate a clinical note, and support improvement of the AI technology. The patient (or guardian, if applicable) and other individuals in attendance at the appointment consented to the use of AI, including the recording.        CHIEF COMPLAINT:    Chief Complaint   Patient presents with    New Patient    Sleep Apnea    Sleep Study    Results       History of Present Illness  The patient is a 70-year-old male being

## 2025-07-30 NOTE — TELEPHONE ENCOUNTER
Pt's Npsg, done on 7/12/25 showed AHI 14.3  Sao2 78%. Please schedule pt for a New Pt Psg appointment asap per Dr Durham.

## 2025-07-30 NOTE — PATIENT INSTRUCTIONS
Medicare compliance discussed with patient.  Minimally 4 hours nightly, every night, 70% of the time, which is 21/30 days over 4 hours.  Our goal is for you to wear the CPAP the entire night's sleep.    Patient Education        Sleep Apnea: Care Instructions  Overview     Sleep apnea means that you frequently stop breathing for 10 seconds or longer during sleep. It can be mild to severe, based on the number of times an hour that you stop breathing.  Blocked or narrowed airways in your nose, mouth, or throat can cause sleep apnea. Your airway can become blocked when your throat muscles and tongue relax during sleep.  You can help treat sleep apnea at home by making lifestyle changes. You also can use a CPAP breathing machine that keeps tissues in the throat from blocking your airway. You may use a mouth or nose device while you sleep to help keep your airway open. Surgery may be an option for some people. Surgery may be done to implant a nerve stimulation device in the chest that helps keep the airway open. Surgery can also be done to remove enlarged tissues that are blocking the throat.  Follow-up care is a key part of your treatment and safety. Be sure to make and go to all appointments, and call your doctor if you are having problems. It's also a good idea to know your test results and keep a list of the medicines you take.  How can you care for yourself at home?  Lose weight, if needed.  Sleep on your side. It may help mild apnea.  Avoid alcohol and medicines such as sleeping pills, opioids, or sedatives before bed.  Don't smoke. If you need help quitting, talk to your doctor.  Prop up the head of your bed.  Treat breathing problems, such as a stuffy nose, that are caused by a cold or allergies.  Try a continuous positive airway pressure (CPAP) breathing machine if your doctor recommends it.  If CPAP doesn't work for you, ask your doctor if you can try other masks, settings, or breathing machines.  Try oral

## 2025-07-30 NOTE — PROGRESS NOTES
Diseases, 8-15 minutes []  Need for prophylactic vaccination against Streptococcus pneumoniae (pneumococcus)  -     pneumococcal 20-valent conjugat (PREVNAR) 0.5 ML BRITNEY inj; Inject 0.5 mLs into the muscle once for 1 dose, Disp-0.5 mL, R-0Normal  Need for prophylactic vaccination against diphtheria-tetanus-pertussis (DTP)  -     Tdap (ADACEL) 5-2-15.5 LF-MCG/0.5 injection; Inject 0.5 mLs into the muscle once for 1 dose, Disp-0.5 mL, R-0Normal  Personal history of tobacco use, presenting hazards to health  -     US ABDOMEN LIMITED; Future  Screening for AAA (abdominal aortic aneurysm)  -     US ABDOMEN LIMITED; Future  Familial hypercholesterolemia  -     Lipid Panel; Future  -     AST; Future  -     ALT; Future  Statin myopathy  -     Lipid Panel; Future  -     AST; Future  -     ALT; Future  Atherosclerosis of native coronary artery of native heart without angina pectoris  -     Lipid Panel; Future  -     AST; Future  -     ALT; Future  Type 2 diabetes mellitus with other circulatory complications (HCC)  -     Lipid Panel; Future  -     AST; Future  -     ALT; Future  History of CVA with residual deficit  -     Lipid Panel; Future  -     AST; Future  -     ALT; Future         An electronic signature was used to authenticate this note.  -- Reno Amador MD      Return in about 3 months (around 10/30/2025) for Follow up in 3 months with labs prior.       Subjective   After the AWV was completed, the patient has additional issues to discuss  Reymundo Bocanegra 70 y.o. male   He was seen in the ER at [facility] on 6/23/2025 with complaint of left hand weakness.  He also reported that he may have had an extremely brief period (he estimates less than 10 seconds) of dysarthria.  CT of the head showed no evidence of acute intracranial abnormality.  CTA of the head and neck showed no evidence of an acute arterial abnormality in the head or neck.  Symptoms felt to be due to a peripheral nerve issue, but because